# Patient Record
Sex: MALE | Race: WHITE | HISPANIC OR LATINO | URBAN - METROPOLITAN AREA
[De-identification: names, ages, dates, MRNs, and addresses within clinical notes are randomized per-mention and may not be internally consistent; named-entity substitution may affect disease eponyms.]

---

## 2017-02-09 ENCOUNTER — TELEPHONE (OUTPATIENT)
Dept: FAMILY MEDICINE | Facility: CLINIC | Age: 57
End: 2017-02-09

## 2017-02-09 DIAGNOSIS — I10 HYPERTENSION GOAL BP (BLOOD PRESSURE) < 140/90: Primary | ICD-10-CM

## 2017-02-09 DIAGNOSIS — N52.9 ERECTILE DYSFUNCTION, UNSPECIFIED ERECTILE DYSFUNCTION TYPE: ICD-10-CM

## 2017-02-09 NOTE — TELEPHONE ENCOUNTER
amLODIPine (NORVASC) 5 MG tablet      Last Written Prescription Date: 1-8-16  Last Fill Quantity: 90, # refills: 3    Last Office Visit with [facility], [facility] or [facility] prescribing provider:  1-18-16   Future Office Visit:        BP Readings from Last 3 Encounters:   01/08/16 130/88   08/13/14 124/80   10/15/13 134/86       lisinopril (PRINIVIL,ZESTRIL) 20 MG tablet      Last Written Prescription Date: 1-8-16  Last Fill Quantity: 180, # refills: 3  Last Office Visit with [facility], [facility] or [facility] prescribing provider: 1-18-16       POTASSIUM   Date Value Ref Range Status   01/08/2016 4.3 3.4 - 5.3 mmol/L Final     CREATININE   Date Value Ref Range Status   01/08/2016 0.88 0.66 - 1.25 mg/dL Final     BP Readings from Last 3 Encounters:   01/08/16 130/88   08/13/14 124/80   10/15/13 134/86       tadalafil (CIALIS) 20 MG tablet      Last Written Prescription Date: 1-8-16  Last Fill Quantity: 10,  # refills: 6   Last Office Visit with [facility], [facility] or [facility] prescribing provider: 1-18-16

## 2017-02-10 RX ORDER — AMLODIPINE BESYLATE 5 MG/1
5 TABLET ORAL DAILY
Qty: 30 TABLET | Refills: 0 | Status: SHIPPED | OUTPATIENT
Start: 2017-02-10 | End: 2017-02-28

## 2017-02-10 RX ORDER — LISINOPRIL 20 MG/1
20 TABLET ORAL 2 TIMES DAILY
Qty: 60 TABLET | Refills: 0 | Status: SHIPPED | OUTPATIENT
Start: 2017-02-10 | End: 2017-02-28

## 2017-02-10 RX ORDER — TADALAFIL 20 MG/1
10-20 TABLET ORAL DAILY PRN
Qty: 10 TABLET | Refills: 0 | Status: SHIPPED | OUTPATIENT
Start: 2017-02-10 | End: 2017-02-28

## 2017-02-10 NOTE — TELEPHONE ENCOUNTER
Routing refill request to provider for review/approval because:  Patient needs to be seen because it has been more than 1 year since last office visit.  Overdue for labs.    Melani Terry RN C[facility] Triage.

## 2017-02-27 NOTE — PATIENT INSTRUCTIONS
Preventive Health Recommendations  Male Ages 50   64    Yearly exam:             See your health care provider every year in order to  o   Review health changes.   o   Discuss preventive care.    o   Review your medicines if your doctor has prescribed any.     Have a cholesterol test every 5 years, or more frequently if you are at risk for high cholesterol/heart disease.     Have a diabetes test (fasting glucose) every three years. If you are at risk for diabetes, you should have this test more often.     Have a colonoscopy at age 50, or have a yearly FIT test (stool test). These exams will check for colon cancer.      Talk with your health care provider about whether or not a prostate cancer screening test (PSA) is right for you.    You should be tested each year for STDs (sexually transmitted diseases), if you re at risk.     Shots: Get a flu shot each year. Get a tetanus shot every 10 years.     Nutrition:    Eat at least 5 servings of fruits and vegetables daily.     Eat whole-grain bread, whole-wheat pasta and brown rice instead of white grains and rice.     Talk to your provider about Calcium and Vitamin D.     Lifestyle    Exercise for at least 150 minutes a week (30 minutes a day, 5 days a week). This will help you control your weight and prevent disease.     Limit alcohol to one drink per day.     No smoking.     Wear sunscreen to prevent skin cancer.     See your dentist every six months for an exam and cleaning.     See your eye doctor every 1 to 2 years.      Continue same medications    Keep working on healthy diet/exercise     We will send you lab results

## 2017-02-27 NOTE — PROGRESS NOTES
SUBJECTIVE:     CC: Jimbo Meyer is an 56 year old male who presents for preventative health visit.     Healthy Habits:    Do you get at least three servings of calcium containing foods daily (dairy, green leafy vegetables, etc.)? yes    Amount of exercise or daily activities, outside of work: 4 day(s) per week    Problems taking medications regularly No    Medication side effects: No    Have you had an eye exam in the past two years? no    Do you see a dentist twice per year? yes    Do you have sleep apnea, excessive snoring or daytime drowsiness?no        None    Exercising a lot; hiking and rock climbing    Occasionally checks blood pressure; similar to what we got here    cialis working fine, no side effects         Today's PHQ-2 Score:   PHQ-2 ( 1999 Pf[employer]) 2/28/2017 1/8/2016   Q1: Little interest or pleasure in doing things 0 0   Q2: Feeling down, depressed or hopeless 0 0   PHQ-2 Score 0 0       Abuse: Current or Past(Physical, Sexual or Emotional)- No  Do you feel safe in your environment - Yes    Social History   Substance Use Topics     Smoking status: Never Smoker     Smokeless tobacco: Never Used     Alcohol use Yes      Comment: occassionally drinks beer 2-4 a week     The patient does not drink >3 drinks per day nor >7 drinks per week.    Last PSA:   PSA   Date Value Ref Range Status   01/08/2016 0.62 0 - 4 ug/L Final       Recent Labs   Lab Test  01/08/16   0716  08/13/14   0750  10/15/13   0735   CHOL  195  172  191   HDL  49  46  44   LDL  109*  101  101   TRIG  187*  124  232*   CHOLHDLRATIO   --   3.7  4.4   NHDL  146*   --    --        Reviewed orders with patient. Reviewed health maintenance and updated orders accordingly - Yes    All Histories reviewed and updated in Epic.      ROS:  C: NEGATIVE for fever, chills, change in weight  I: NEGATIVE for worrisome rashes, moles or lesions  E: NEGATIVE for vision changes or irritation; has lasik few years ago, vision fine but uses readers  ENT:  "NEGATIVE for ear, mouth and throat problems  R: NEGATIVE for significant cough or SOB  CV: NEGATIVE for chest pain, palpitations or peripheral edema  GI: NEGATIVE for nausea, abdominal pain, heartburn, or change in bowel habits   male: negative for dysuria, hematuria, decreased urinary stream, erectile dysfunction, urethral discharge  M: NEGATIVE for significant arthralgias or myalgia  N: NEGATIVE for weakness, dizziness or paresthesias  P: NEGATIVE for changes in mood or affect       OBJECTIVE:     /89 (BP Location: Right arm, Patient Position: Chair, Cuff Size: Adult Regular)  Pulse 61  Temp 98.6  F (37  C) (Oral)  Ht 5' 5.16\" (1.655 m)  Wt 169 lb (76.7 kg)  SpO2 99%  BMI 27.99 kg/m2  EXAM:  GENERAL: healthy, alert and no distress  HENT: ear canals and TM's normal, nose and mouth without ulcers or lesions  NECK: no adenopathy, no asymmetry, masses, or scars and thyroid normal to palpation  RESP: lungs clear to auscultation - no rales, rhonchi or wheezes  CV: regular rate and rhythm, normal S1 S2, no S3 or S4, no murmur, click or rub, no peripheral edema and peripheral pulses strong  ABDOMEN: soft, nontender, no hepatosplenomegaly, no masses and bowel sounds normal   (male): normal male genitalia without lesions or urethral discharge, no hernia  RECTAL: normal sphincter tone, no rectal masses, prostate normal size, smooth, nontender without nodules or masses  MS: no gross musculoskeletal defects noted, no edema  SKIN: no suspicious lesions or rashes  NEURO: Normal strength and tone, mentation intact and speech normal  PSYCH: mentation appears normal, affect normal/bright    ASSESSMENT/PLAN:     Jimbo was seen today for physical, health maintenance and *_* health care directive *_*.    Diagnoses and all orders for this visit:    Routine general medical examination at a health care facility    Hypertension goal BP (blood pressure) < 140/90  -     amLODIPine (NORVASC) 5 MG tablet; Take 1 tablet (5 mg) by " "mouth daily  -     lisinopril (PRINIVIL/ZESTRIL) 20 MG tablet; Take 1 tablet (20 mg) by mouth 2 times daily    Screening examination for infectious disease  -     Hepatitis C antibody    Erectile dysfunction, unspecified erectile dysfunction type  -     tadalafil (CIALIS) 20 MG tablet; Take 0.5-1 tablets (10-20 mg) by mouth daily as needed for erectile dysfunction Never use with nitroglycerin, terazosin or doxazosin.    Hyperlipidemia LDL goal <100  -     Comprehensive metabolic panel  -     Lipid panel reflex to direct LDL    Screening for prostate cancer  -     Prostate spec antigen screen    Fatigue, unspecified type  -     TSH with free T4 reflex  -     CBC with platelets differential    Impaired fasting glucose  -     Hemoglobin A1c    Other orders  -     Cancel: Basic metabolic panel    Overall patient in good health  Blood pressure barely okay, refill meds  Up to date on colonoscopy  Check labs  Very occasionally gets vertigo.  Seen neurology in past for this.  No new symptoms recently.  Refill cialis, working fine.    COUNSELING:  Reviewed preventive health counseling, as reflected in patient instructions       Regular exercise       Healthy diet/nutrition       Vision screening       Safe sex practices/STD prevention       Colon cancer screening       Prostate cancer screening         reports that he has never smoked. He has never used smokeless tobacco.    Estimated body mass index is 27.99 kg/(m^2) as calculated from the following:    Height as of this encounter: 5' 5.16\" (1.655 m).    Weight as of this encounter: 169 lb (76.7 kg).   Weight management plan: Discussed healthy diet and exercise guidelines and patient will follow up in 12 months in clinic to re-evaluate.    Counseling Resources:  ATP IV Guidelines  Pooled Cohorts Equation Calculator  FRAX Risk Assessment  ICSI Preventive Guidelines  Dietary Guidelines for Americans, 2010  USDA's MyPlate  ASA Prophylaxis  Lung CA Screening    Sourav TRACY " MD [place]  [facility]

## 2017-02-28 ENCOUNTER — OFFICE VISIT (OUTPATIENT)
Dept: FAMILY MEDICINE | Facility: CLINIC | Age: 57
End: 2017-02-28
Payer: COMMERCIAL

## 2017-02-28 VITALS
SYSTOLIC BLOOD PRESSURE: 135 MMHG | TEMPERATURE: 98.6 F | WEIGHT: 169 LBS | OXYGEN SATURATION: 99 % | BODY MASS INDEX: 28.16 KG/M2 | HEIGHT: 65 IN | HEART RATE: 61 BPM | DIASTOLIC BLOOD PRESSURE: 89 MMHG

## 2017-02-28 DIAGNOSIS — Z11.9 SCREENING EXAMINATION FOR INFECTIOUS DISEASE: ICD-10-CM

## 2017-02-28 DIAGNOSIS — I10 HYPERTENSION GOAL BP (BLOOD PRESSURE) < 140/90: ICD-10-CM

## 2017-02-28 DIAGNOSIS — Z00.00 ROUTINE GENERAL MEDICAL EXAMINATION AT A HEALTH CARE FACILITY: Primary | ICD-10-CM

## 2017-02-28 DIAGNOSIS — R73.01 IMPAIRED FASTING GLUCOSE: ICD-10-CM

## 2017-02-28 DIAGNOSIS — R53.83 FATIGUE, UNSPECIFIED TYPE: ICD-10-CM

## 2017-02-28 DIAGNOSIS — Z12.5 SCREENING FOR PROSTATE CANCER: ICD-10-CM

## 2017-02-28 DIAGNOSIS — N52.9 ERECTILE DYSFUNCTION, UNSPECIFIED ERECTILE DYSFUNCTION TYPE: ICD-10-CM

## 2017-02-28 DIAGNOSIS — E78.5 HYPERLIPIDEMIA LDL GOAL <100: ICD-10-CM

## 2017-02-28 PROBLEM — Z71.89 ADVANCED CARE PLANNING/COUNSELING DISCUSSION: Status: ACTIVE | Noted: 2017-02-28

## 2017-02-28 LAB
ALBUMIN SERPL-MCNC: 3.7 G/DL (ref 3.4–5)
ALP SERPL-CCNC: 64 U/L (ref 40–150)
ALT SERPL W P-5'-P-CCNC: 30 U/L (ref 0–70)
ANION GAP SERPL CALCULATED.3IONS-SCNC: 8 MMOL/L (ref 3–14)
AST SERPL W P-5'-P-CCNC: 19 U/L (ref 0–45)
BASOPHILS # BLD AUTO: 0.1 10E9/L (ref 0–0.2)
BASOPHILS NFR BLD AUTO: 1.2 %
BILIRUB SERPL-MCNC: 0.4 MG/DL (ref 0.2–1.3)
BUN SERPL-MCNC: 13 MG/DL (ref 7–30)
CALCIUM SERPL-MCNC: 9 MG/DL (ref 8.5–10.1)
CHLORIDE SERPL-SCNC: 102 MMOL/L (ref 94–109)
CHOLEST SERPL-MCNC: 162 MG/DL
CO2 SERPL-SCNC: 25 MMOL/L (ref 20–32)
CREAT SERPL-MCNC: 0.79 MG/DL (ref 0.66–1.25)
DIFFERENTIAL METHOD BLD: ABNORMAL
EOSINOPHIL # BLD AUTO: 0.2 10E9/L (ref 0–0.7)
EOSINOPHIL NFR BLD AUTO: 5.2 %
ERYTHROCYTE [DISTWIDTH] IN BLOOD BY AUTOMATED COUNT: 11.7 % (ref 10–15)
GFR SERPL CREATININE-BSD FRML MDRD: NORMAL ML/MIN/1.7M2
GLUCOSE SERPL-MCNC: 82 MG/DL (ref 70–99)
HBA1C MFR BLD: 5 % (ref 4.3–6)
HCT VFR BLD AUTO: 41.3 % (ref 40–53)
HCV AB SERPL QL IA: NORMAL
HDLC SERPL-MCNC: 47 MG/DL
HGB BLD-MCNC: 14.6 G/DL (ref 13.3–17.7)
LDLC SERPL CALC-MCNC: 93 MG/DL
LYMPHOCYTES # BLD AUTO: 1.2 10E9/L (ref 0.8–5.3)
LYMPHOCYTES NFR BLD AUTO: 30.6 %
MCH RBC QN AUTO: 33.2 PG (ref 26.5–33)
MCHC RBC AUTO-ENTMCNC: 35.4 G/DL (ref 31.5–36.5)
MCV RBC AUTO: 94 FL (ref 78–100)
MONOCYTES # BLD AUTO: 0.4 10E9/L (ref 0–1.3)
MONOCYTES NFR BLD AUTO: 8.9 %
NEUTROPHILS # BLD AUTO: 2.2 10E9/L (ref 1.6–8.3)
NEUTROPHILS NFR BLD AUTO: 54.1 %
NONHDLC SERPL-MCNC: 115 MG/DL
PLATELET # BLD AUTO: 268 10E9/L (ref 150–450)
POTASSIUM SERPL-SCNC: 4.1 MMOL/L (ref 3.4–5.3)
PROT SERPL-MCNC: 6.9 G/DL (ref 6.8–8.8)
PSA SERPL-ACNC: 0.5 UG/L (ref 0–4)
RBC # BLD AUTO: 4.4 10E12/L (ref 4.4–5.9)
SODIUM SERPL-SCNC: 135 MMOL/L (ref 133–144)
TRIGL SERPL-MCNC: 111 MG/DL
TSH SERPL DL<=0.005 MIU/L-ACNC: 1.51 MU/L (ref 0.4–4)
WBC # BLD AUTO: 4.1 10E9/L (ref 4–11)

## 2017-02-28 PROCEDURE — 36415 COLL VENOUS BLD VENIPUNCTURE: CPT | Performed by: FAMILY MEDICINE

## 2017-02-28 PROCEDURE — 83036 HEMOGLOBIN GLYCOSYLATED A1C: CPT | Performed by: FAMILY MEDICINE

## 2017-02-28 PROCEDURE — G0103 PSA SCREENING: HCPCS | Performed by: FAMILY MEDICINE

## 2017-02-28 PROCEDURE — 86803 HEPATITIS C AB TEST: CPT | Performed by: FAMILY MEDICINE

## 2017-02-28 PROCEDURE — 80061 LIPID PANEL: CPT | Performed by: FAMILY MEDICINE

## 2017-02-28 PROCEDURE — 99396 PREV VISIT EST AGE 40-64: CPT | Performed by: FAMILY MEDICINE

## 2017-02-28 PROCEDURE — 80050 GENERAL HEALTH PANEL: CPT | Performed by: FAMILY MEDICINE

## 2017-02-28 RX ORDER — LISINOPRIL 20 MG/1
20 TABLET ORAL 2 TIMES DAILY
Qty: 180 TABLET | Refills: 3 | Status: SHIPPED | OUTPATIENT
Start: 2017-02-28 | End: 2017-08-08

## 2017-02-28 RX ORDER — AMLODIPINE BESYLATE 5 MG/1
5 TABLET ORAL DAILY
Qty: 90 TABLET | Refills: 3 | Status: SHIPPED | OUTPATIENT
Start: 2017-02-28 | End: 2018-04-20

## 2017-02-28 RX ORDER — TADALAFIL 20 MG/1
10-20 TABLET ORAL DAILY PRN
Qty: 10 TABLET | Refills: 6 | Status: SHIPPED | OUTPATIENT
Start: 2017-02-28 | End: 2018-05-10

## 2017-02-28 ASSESSMENT — PAIN SCALES - GENERAL: PAINLEVEL: NO PAIN (0)

## 2017-02-28 NOTE — MR AVS SNAPSHOT
After Visit Summary   2/28/2017    Jimbo Meyer    MRN: [id number]           Patient Information     Date Of Birth          1960        Visit Information        Provider Department      2/28/2017 6:40 AM Sourav Harding MD [facility]        Today's Diagnoses     Screening examination for infectious disease    -  1    Hypertension goal BP (blood pressure) < 140/90        Erectile dysfunction, unspecified erectile dysfunction type        Hyperlipidemia LDL goal <100        Screening for prostate cancer        Fatigue, unspecified type        Impaired fasting glucose          Care Instructions      Preventive Health Recommendations  Male Ages 50 - 64    Yearly exam:             See your health care provider every year in order to  o   Review health changes.   o   Discuss preventive care.    o   Review your medicines if your doctor has prescribed any.     Have a cholesterol test every 5 years, or more frequently if you are at risk for high cholesterol/heart disease.     Have a diabetes test (fasting glucose) every three years. If you are at risk for diabetes, you should have this test more often.     Have a colonoscopy at age 50, or have a yearly FIT test (stool test). These exams will check for colon cancer.      Talk with your health care provider about whether or not a prostate cancer screening test (PSA) is right for you.    You should be tested each year for STDs (sexually transmitted diseases), if you re at risk.     Shots: Get a flu shot each year. Get a tetanus shot every 10 years.     Nutrition:    Eat at least 5 servings of fruits and vegetables daily.     Eat whole-grain bread, whole-wheat pasta and brown rice instead of white grains and rice.     Talk to your provider about Calcium and Vitamin D.     Lifestyle    Exercise for at least 150 minutes a week (30 minutes a day, 5 days a week). This will help you control your weight and prevent disease.     Limit  "alcohol to one drink per day.     No smoking.     Wear sunscreen to prevent skin cancer.     See your dentist every six months for an exam and cleaning.     See your eye doctor every 1 to 2 years.      Continue same medications    Keep working on healthy diet/exercise     We will send you lab results            Follow-ups after your visit        Who to contact     If you have questions or need follow up information about today's clinic visit or your schedule please contact [facility] directly at [phone].  Normal or non-critical lab and imaging results will be communicated to you by MyChart, letter or phone within 4 business days after the clinic has received the results. If you do not hear from us within 7 days, please contact the clinic through UrgentRx[facility] or phone. If you have a critical or abnormal lab result, we will notify you by phone as soon as possible.  Submit refill requests through Clone or call your pharmacy and they will forward the refill request to us. Please allow 3 business days for your refill to be completed.          Additional Information About Your Visit        UrgentRxharBusyEvent Information     Clone lets you send messages to your doctor, view your test results, renew your prescriptions, schedule appointments and more. To sign up, go to [email or web address] . Click on \"Log in\" on the left side of the screen, which will take you to the Welcome page. Then click on \"Sign up Now\" on the right side of the page.     You will be asked to enter the access code listed below, as well as some personal information. Please follow the directions to create your username and password.     Your access code is: [email or web address]  Expires: 2017 10:07 AM     Your access code will  in 90 days. If you need help or a new code, please call your [facility] or [phone].        Care EveryWhere ID     This is your Care EveryWhere ID. This could be used by other organizations to access " "your [facility] medical records  [id number]        Your Vitals Were     Pulse Temperature Height Pulse Oximetry BMI (Body Mass Index)       61 98.6  F (37  C) (Oral) 5' 5.16\" (1.655 m) 99% 27.99 kg/m2        Blood Pressure from Last 3 Encounters:   02/28/17 135/89   01/08/16 130/88   08/13/14 124/80    Weight from Last 3 Encounters:   02/28/17 169 lb (76.7 kg)   01/08/16 183 lb (83 kg)   08/13/14 184 lb (83.5 kg)              We Performed the Following     CBC with platelets differential     Comprehensive metabolic panel     Hemoglobin A1c     Hepatitis C antibody     Lipid panel reflex to direct LDL     Prostate spec antigen screen     TSH with free T4 reflex          Where to get your medicines      These medications were sent to [facility] [id number] [facility] [address]  [address], [facility] [address]     Phone:  [phone]     amLODIPine 5 MG tablet    lisinopril 20 MG tablet    tadalafil 20 MG tablet          Primary Care Provider Office Phone # Fax #    Sourav Harding [phone]       [facility] [address]  [facility] [address]        Thank you!     Thank you for choosing [facility]  for your care. Our goal is always to provide you with excellent care. Hearing back from our patients is one way we can continue to improve our services. Please take a few minutes to complete the written survey that you may receive in the mail after your visit with us. Thank you!             Your Updated Medication List - Protect others around you: Learn how to safely use, store and throw away your medicines at www.disposemymeds.org.          This list is accurate as of: 2/28/17  7:03 AM.  Always use your most recent med list.                   Brand Name Dispense Instructions for use    amLODIPine 5 MG tablet    NORVASC    90 tablet    Take 1 tablet (5 mg) by mouth daily       aspirin 81 MG tablet     30 tablet    " Take 1 tablet by mouth daily.       calcium-vitamin D 600-400 MG-UNIT per tablet    CALTRATE    60 tablet    Take 1 tablet by mouth 2 times daily.       fish oil-omega-3 fatty acids 1000 MG capsule      take 2 g by mouth daily.       lisinopril 20 MG tablet    PRINIVIL/ZESTRIL    180 tablet    Take 1 tablet (20 mg) by mouth 2 times daily       MULTIVITAMIN TABS   OR      1 TABLET DAILY       tadalafil 20 MG tablet    CIALIS    10 tablet    Take 0.5-1 tablets (10-20 mg) by mouth daily as needed for erectile dysfunction Never use with nitroglycerin, terazosin or doxazosin.

## 2017-02-28 NOTE — NURSING NOTE
"Chief Complaint   Patient presents with     Physical     Health Maintenance     *_* Health Care Directive *_*       Initial /89 (BP Location: Right arm, Patient Position: Chair, Cuff Size: Adult Regular)  Pulse 61  Temp 98.6  F (37  C) (Oral)  Ht 5' 5.16\" (1.655 m)  Wt 169 lb (76.7 kg)  SpO2 99%  BMI 27.99 kg/m2 Estimated body mass index is 27.99 kg/(m^2) as calculated from the following:    Height as of this encounter: 5' 5.16\" (1.655 m).    Weight as of this encounter: 169 lb (76.7 kg).  Medication Reconciliation: complete   Bailey Estrella CMA      "

## 2017-02-28 NOTE — LETTER
[facility]  [address] [place]  [address]  [phone]        March 2, 2017    Jimbo Meyer  [address] [place] [address]        Dear Jimbo,    Your labs are all fine.     Results for orders placed or performed in visit on 02/28/17   Hepatitis C antibody   Result Value Ref Range    Hepatitis C Antibody  NR     Nonreactive   Assay performance characteristics have not been established for newborns,   infants, and children     TSH with free T4 reflex   Result Value Ref Range    TSH 1.51 0.40 - 4.00 mU/L   Comprehensive metabolic panel   Result Value Ref Range    Sodium 135 133 - 144 mmol/L    Potassium 4.1 3.4 - 5.3 mmol/L    Chloride 102 94 - 109 mmol/L    Carbon Dioxide 25 20 - 32 mmol/L    Anion Gap 8 3 - 14 mmol/L    Glucose 82 70 - 99 mg/dL    Urea Nitrogen 13 7 - 30 mg/dL    Creatinine 0.79 0.66 - 1.25 mg/dL    GFR Estimate >90  Non  GFR Calc   >60 mL/min/1.7m2    GFR Estimate If Black >90   GFR Calc   >60 mL/min/1.7m2    Calcium 9.0 8.5 - 10.1 mg/dL    Bilirubin Total 0.4 0.2 - 1.3 mg/dL    Albumin 3.7 3.4 - 5.0 g/dL    Protein Total 6.9 6.8 - 8.8 g/dL    Alkaline Phosphatase 64 40 - 150 U/L    ALT 30 0 - 70 U/L    AST 19 0 - 45 U/L   Hemoglobin A1c   Result Value Ref Range    Hemoglobin A1C 5.0 4.3 - 6.0 %   Prostate spec antigen screen   Result Value Ref Range    PSA 0.50 0 - 4 ug/L   Lipid panel reflex to direct LDL   Result Value Ref Range    Cholesterol 162 <200 mg/dL    Triglycerides 111 <150 mg/dL    HDL Cholesterol 47 >39 mg/dL    LDL Cholesterol Calculated 93 <100 mg/dL    Non HDL Cholesterol 115 <130 mg/dL   CBC with platelets differential   Result Value Ref Range    WBC 4.1 4.0 - 11.0 10e9/L    RBC Count 4.40 4.4 - 5.9 10e12/L    Hemoglobin 14.6 13.3 - 17.7 g/dL    Hematocrit 41.3 40.0 - 53.0 %    MCV 94 78 - 100 fl    MCH 33.2 (H) 26.5 - 33.0 pg    MCHC 35.4 31.5 - 36.5 g/dL    RDW 11.7 10.0 - 15.0 %     Platelet Count 268 150 - 450 10e9/L    Diff Method Automated Method     % Neutrophils 54.1 %    % Lymphocytes 30.6 %    % Monocytes 8.9 %    % Eosinophils 5.2 %    % Basophils 1.2 %    Absolute Neutrophil 2.2 1.6 - 8.3 10e9/L    Absolute Lymphocytes 1.2 0.8 - 5.3 10e9/L    Absolute Monocytes 0.4 0.0 - 1.3 10e9/L    Absolute Eosinophils 0.2 0.0 - 0.7 10e9/L    Absolute Basophils 0.1 0.0 - 0.2 10e9/L       If you have any questions please call the clinic at [phone].    Sincerely,    Sourav BOWERL

## 2017-05-17 ENCOUNTER — RADIANT APPOINTMENT (OUTPATIENT)
Dept: GENERAL RADIOLOGY | Facility: CLINIC | Age: 57
End: 2017-05-17
Attending: FAMILY MEDICINE
Payer: COMMERCIAL

## 2017-05-17 ENCOUNTER — OFFICE VISIT (OUTPATIENT)
Dept: FAMILY MEDICINE | Facility: CLINIC | Age: 57
End: 2017-05-17
Payer: COMMERCIAL

## 2017-05-17 VITALS
SYSTOLIC BLOOD PRESSURE: 134 MMHG | HEART RATE: 62 BPM | DIASTOLIC BLOOD PRESSURE: 82 MMHG | WEIGHT: 178 LBS | TEMPERATURE: 98.9 F | BODY MASS INDEX: 29.48 KG/M2 | OXYGEN SATURATION: 98 %

## 2017-05-17 DIAGNOSIS — J20.9 ACUTE BRONCHITIS WITH SYMPTOMS > 10 DAYS: ICD-10-CM

## 2017-05-17 DIAGNOSIS — R05.9 COUGH: Primary | ICD-10-CM

## 2017-05-17 DIAGNOSIS — R05.9 COUGH: ICD-10-CM

## 2017-05-17 PROCEDURE — 71020 XR CHEST 2 VW: CPT

## 2017-05-17 PROCEDURE — 99213 OFFICE O/P EST LOW 20 MIN: CPT | Performed by: FAMILY MEDICINE

## 2017-05-17 RX ORDER — AZITHROMYCIN 250 MG/1
TABLET, FILM COATED ORAL
Qty: 6 TABLET | Refills: 1 | Status: SHIPPED | OUTPATIENT
Start: 2017-05-17 | End: 2018-05-10

## 2017-05-17 NOTE — PROGRESS NOTES
SUBJECTIVE:                                                    Jimbo Meyer is a 56 year old male who presents to clinic today for the following health issues:      Acute Illness   Acute illness concerns: URI  Onset: 1 month    Fever: no    Chills/Sweats: no    Headache (location?): no    Sinus Pressure:no    Conjunctivitis:  no    Ear Pain: no    Rhinorrhea: YES    Congestion: YES- chest    Sore Throat: no     Cough: YES-productive of green sputum    Wheeze: no    Decreased Appetite: no    Nausea: no    Vomiting: no    Diarrhea:  no    Dysuria/Freq.: no    Fatigue/Achiness: YES    Sick/Strep Exposure: YES- work     Therapies Tried and outcome: OTC Zicam,              Problem list and histories reviewed & adjusted, as indicated.  Additional history: as documented         Reviewed and updated as needed this visit by clinical staff       Reviewed and updated as needed this visit by Provider       Started throat    Coughing up stuff from chest    Still working etc    A little exercise outside of work    Occasional bad cough at night    Had runny nose, not now    No other allergy type symptoms     Physical Exam   Constitutional: He is oriented to person, place, and time and well-developed, well-nourished, and in no distress.   HENT:   Head: Normocephalic and atraumatic.   Right Ear: Tympanic membrane, external ear and ear canal normal.   Left Ear: Tympanic membrane, external ear and ear canal normal.   No submandib / sinus tenderness     Eyes: Conjunctivae are normal.   Neck: Neck supple.   Cardiovascular: Normal rate, regular rhythm and normal heart sounds.    Pulmonary/Chest: Effort normal and breath sounds normal. No respiratory distress. He exhibits no tenderness.   No back or costovertebral angle tenderness     Abdominal: Soft. There is no tenderness.   Lymphadenopathy:     He has no cervical adenopathy.   Neurological: He is alert and oriented to person, place, and time.            cxr no  infiltrate    ASSESSMENT / PLAN:  (R05) Cough  (primary encounter diagnosis)  Comment: no pneumonia  Plan: XR Chest 2 Views             (J20.9) Acute bronchitis with symptoms > 10 days  Comment: given duration of symptoms and lack of other explanation ( no obvious allergy/ acid symptoms ) will treat for infection   Plan: azithromycin (ZITHROMAX) 250 MG tablet         Follow up if symptoms not resolving       I reviewed the patient's medications, allergies, medical history, family history, and social history.    Sourav Harding MD

## 2017-05-17 NOTE — MR AVS SNAPSHOT
"              After Visit Summary   5/17/2017    Jimbo Meyer    MRN: [id number]           Patient Information     Date Of Birth          1960        Visit Information        Provider Department      5/17/2017 2:20 PM Sourav Harding MD [facility]        Today's Diagnoses     Cough    -  1    Acute bronchitis with symptoms > 10 days          Care Instructions    Take the antibiotics ; keeps working for a week after finishing    One refill if needed    Stay well hydrated    Follow up as needed based on symptoms         Follow-ups after your visit        Who to contact     If you have questions or need follow up information about today's clinic visit or your schedule please contact [facility] directly at [phone].  Normal or non-critical lab and imaging results will be communicated to you by MyChart, letter or phone within 4 business days after the clinic has received the results. If you do not hear from us within 7 days, please contact the clinic through MyChart or phone. If you have a critical or abnormal lab result, we will notify you by phone as soon as possible.  Submit refill requests through FastCall or call your pharmacy and they will forward the refill request to us. Please allow 3 business days for your refill to be completed.          Additional Information About Your Visit        MyChart Information     FastCall lets you send messages to your doctor, view your test results, renew your prescriptions, schedule appointments and more. To sign up, go to [email or web address] . Click on \"Log in\" on the left side of the screen, which will take you to the Welcome page. Then click on \"Sign up Now\" on the right side of the page.     You will be asked to enter the access code listed below, as well as some personal information. Please follow the directions to create your username and password.     Your access code is: [email or web address]  Expires: 5/22/2017 11:07 " AM     Your access code will  in 90 days. If you need help or a new code, please call your [place] clinic or [phone].        Care EveryWhere ID     This is your Care EveryWhere ID. This could be used by other organizations to access your [place] medical records  [id number]        Your Vitals Were     Pulse Temperature Pulse Oximetry BMI (Body Mass Index)          62 98.9  F (37.2  C) (Oral) 98% 29.48 kg/m2         Blood Pressure from Last 3 Encounters:   17 134/82   17 135/89   16 130/88    Weight from Last 3 Encounters:   17 178 lb (80.7 kg)   17 169 lb (76.7 kg)   16 183 lb (83 kg)                 Today's Medication Changes          These changes are accurate as of: 17  2:55 PM.  If you have any questions, ask your nurse or doctor.               Start taking these medicines.        Dose/Directions    azithromycin 250 MG tablet   Commonly known as:  ZITHROMAX   Used for:  Acute bronchitis with symptoms > 10 days   Started by:  Sourav Harding MD        2 po daily x one day then 1 po daily x 4 days   Quantity:  6 tablet   Refills:  1            Where to get your medicines      These medications were sent to [facility]  [facility] [address]  [address], [facility] [address]     Phone:  [phone]     azithromycin 250 MG tablet                Primary Care Provider Office Phone # Fax #    Sourav Harding [phone]       [facility] [address] [facility] [address]        Thank you!     Thank you for choosing [facility]  for your care. Our goal is always to provide you with excellent care. Hearing back from our patients is one way we can continue to improve our services. Please take a few minutes to complete the written survey that you may receive in the mail after your visit with us. Thank you!             Your Updated Medication List -  Protect others around you: Learn how to safely use, store and throw away your medicines at www.disposemymeds.org.          This list is accurate as of: 5/17/17  2:55 PM.  Always use your most recent med list.                   Brand Name Dispense Instructions for use    amLODIPine 5 MG tablet    NORVASC    90 tablet    Take 1 tablet (5 mg) by mouth daily       aspirin 81 MG tablet     30 tablet    Take 1 tablet by mouth daily.       azithromycin 250 MG tablet    ZITHROMAX    6 tablet    2 po daily x one day then 1 po daily x 4 days       calcium-vitamin D 600-400 MG-UNIT per tablet    CALTRATE    60 tablet    Take 1 tablet by mouth 2 times daily.       fish oil-omega-3 fatty acids 1000 MG capsule      take 2 g by mouth daily.       lisinopril 20 MG tablet    PRINIVIL/ZESTRIL    180 tablet    Take 1 tablet (20 mg) by mouth 2 times daily       MULTIVITAMIN TABS   OR      1 TABLET DAILY       tadalafil 20 MG tablet    CIALIS    10 tablet    Take 0.5-1 tablets (10-20 mg) by mouth daily as needed for erectile dysfunction Never use with nitroglycerin, terazosin or doxazosin.

## 2017-05-17 NOTE — NURSING NOTE
"Chief Complaint   Patient presents with     Cold Symptoms     cough for 1 month       Initial BP (!) 140/95 (BP Location: Left arm, Patient Position: Chair, Cuff Size: Adult Regular)  Pulse 62  Temp 98.9  F (37.2  C) (Oral)  Wt 178 lb (80.7 kg)  SpO2 98%  BMI 29.48 kg/m2 Estimated body mass index is 29.48 kg/(m^2) as calculated from the following:    Height as of 2/28/17: 5' 5.16\" (1.655 m).    Weight as of this encounter: 178 lb (80.7 kg).  Medication Reconciliation: incomplete     FREDDY Garcia MA      "

## 2017-05-17 NOTE — PATIENT INSTRUCTIONS
Take the antibiotics ; keeps working for a week after finishing    One refill if needed    Stay well hydrated    Follow up as needed based on symptoms

## 2017-08-08 ENCOUNTER — TELEPHONE (OUTPATIENT)
Dept: FAMILY MEDICINE | Facility: CLINIC | Age: 57
End: 2017-08-08

## 2017-08-08 DIAGNOSIS — I10 HYPERTENSION GOAL BP (BLOOD PRESSURE) < 140/90: Primary | ICD-10-CM

## 2017-08-08 RX ORDER — LISINOPRIL 40 MG/1
40 TABLET ORAL DAILY
Qty: 90 TABLET | Refills: 3 | Status: SHIPPED | OUTPATIENT
Start: 2017-08-08 | End: 2018-05-10

## 2017-08-08 NOTE — TELEPHONE ENCOUNTER
PA is needed for the medication, Lisinopril 20mg.     Insurance has been called.  Alternatives that are covered are: insurance will cover if medication is prescribed as once a day dose.         Would you like to start PA or Rx alternative medication?    If PA, please list all medications this patient has tried along with any contraindications that may have been experienced in the past. Thank you.    Pharmacy: [facility]  Phone: [phone]     Insurance Plan: [employer]  Phone: [phone]  Pt ID: [id number]  Group: [id number]     Forwarded to PCP for review.

## 2017-08-08 NOTE — TELEPHONE ENCOUNTER
Change to 40 daily instead of 20 bid.  I sent in prescription.    Please inform patient.      Sourav Harding MD

## 2018-04-20 ENCOUNTER — TELEPHONE (OUTPATIENT)
Dept: FAMILY MEDICINE | Facility: CLINIC | Age: 58
End: 2018-04-20

## 2018-04-20 DIAGNOSIS — I10 HYPERTENSION GOAL BP (BLOOD PRESSURE) < 140/90: ICD-10-CM

## 2018-04-20 RX ORDER — AMLODIPINE BESYLATE 5 MG/1
TABLET ORAL
Qty: 30 TABLET | Refills: 0 | Status: SHIPPED | OUTPATIENT
Start: 2018-04-20 | End: 2018-05-10

## 2018-04-20 NOTE — TELEPHONE ENCOUNTER
Remedios given x1 today. Patient needs office visit, please schedule. Patient is due for BP visit and labs    Thanks!  Rao Oakley RN

## 2018-04-20 NOTE — TELEPHONE ENCOUNTER
"Requested Prescriptions   Pending Prescriptions Disp Refills     amLODIPine (NORVASC) 5 MG tablet [Pharmacy Med Name: AMLODIPINE 5MG TAB] 90 tablet 3    Last Written Prescription Date:  2/28/17  Last Fill Quantity: 90,  # refills: 3   Last office visit: 5/17/2017 with prescribing provider:     Future Office Visit:     Sig: TAKE ONE TABLET BY MOUTH ONCE DAILY    Calcium Channel Blockers Protocol  Failed    4/20/2018  6:17 AM       Failed - Normal serum creatinine on file in past 12 months    Recent Labs   Lab Test  02/28/17   0709   CR  0.79            Passed - Blood pressure under 140/90 in past 12 months    BP Readings from Last 3 Encounters:   05/17/17 134/82   02/28/17 135/89   01/08/16 130/88                Passed - Recent (12 mo) or future (30 days) visit within the authorizing provider's specialty    Patient had office visit in the last 12 months or has a visit in the next 30 days with authorizing provider or within the authorizing provider's specialty.  See \"Patient Info\" tab in inbasket, or \"Choose Columns\" in Meds & Orders section of the refill encounter.           Passed - Patient is age 18 or older          "

## 2018-05-10 ENCOUNTER — OFFICE VISIT (OUTPATIENT)
Dept: FAMILY MEDICINE | Facility: CLINIC | Age: 58
End: 2018-05-10
Payer: COMMERCIAL

## 2018-05-10 VITALS
HEART RATE: 59 BPM | DIASTOLIC BLOOD PRESSURE: 89 MMHG | SYSTOLIC BLOOD PRESSURE: 125 MMHG | WEIGHT: 177.38 LBS | TEMPERATURE: 97.9 F | HEIGHT: 65 IN | BODY MASS INDEX: 29.55 KG/M2

## 2018-05-10 DIAGNOSIS — Z23 NEED FOR TD VACCINE: ICD-10-CM

## 2018-05-10 DIAGNOSIS — R53.83 FATIGUE, UNSPECIFIED TYPE: ICD-10-CM

## 2018-05-10 DIAGNOSIS — Z00.00 ROUTINE GENERAL MEDICAL EXAMINATION AT A HEALTH CARE FACILITY: Primary | ICD-10-CM

## 2018-05-10 DIAGNOSIS — R73.01 IMPAIRED FASTING GLUCOSE: ICD-10-CM

## 2018-05-10 DIAGNOSIS — E78.5 HYPERLIPIDEMIA LDL GOAL <100: ICD-10-CM

## 2018-05-10 DIAGNOSIS — N52.9 ERECTILE DYSFUNCTION, UNSPECIFIED ERECTILE DYSFUNCTION TYPE: ICD-10-CM

## 2018-05-10 DIAGNOSIS — Z12.5 SCREENING FOR PROSTATE CANCER: ICD-10-CM

## 2018-05-10 DIAGNOSIS — Z11.9 SCREENING EXAMINATION FOR INFECTIOUS DISEASE: ICD-10-CM

## 2018-05-10 DIAGNOSIS — I10 HYPERTENSION GOAL BP (BLOOD PRESSURE) < 140/90: ICD-10-CM

## 2018-05-10 DIAGNOSIS — H54.7 VISION PROBLEM: ICD-10-CM

## 2018-05-10 LAB
ALBUMIN SERPL-MCNC: 3.8 G/DL (ref 3.4–5)
ALP SERPL-CCNC: 57 U/L (ref 40–150)
ALT SERPL W P-5'-P-CCNC: 38 U/L (ref 0–70)
ANION GAP SERPL CALCULATED.3IONS-SCNC: 7 MMOL/L (ref 3–14)
AST SERPL W P-5'-P-CCNC: 23 U/L (ref 0–45)
BASOPHILS # BLD AUTO: 0.1 10E9/L (ref 0–0.2)
BASOPHILS NFR BLD AUTO: 1.2 %
BILIRUB SERPL-MCNC: 0.9 MG/DL (ref 0.2–1.3)
BUN SERPL-MCNC: 13 MG/DL (ref 7–30)
CALCIUM SERPL-MCNC: 8.9 MG/DL (ref 8.5–10.1)
CHLORIDE SERPL-SCNC: 103 MMOL/L (ref 94–109)
CHOLEST SERPL-MCNC: 144 MG/DL
CO2 SERPL-SCNC: 26 MMOL/L (ref 20–32)
CREAT SERPL-MCNC: 0.88 MG/DL (ref 0.66–1.25)
DIFFERENTIAL METHOD BLD: NORMAL
EOSINOPHIL # BLD AUTO: 0.3 10E9/L (ref 0–0.7)
EOSINOPHIL NFR BLD AUTO: 7.4 %
ERYTHROCYTE [DISTWIDTH] IN BLOOD BY AUTOMATED COUNT: 11.4 % (ref 10–15)
GFR SERPL CREATININE-BSD FRML MDRD: 89 ML/MIN/1.7M2
GLUCOSE SERPL-MCNC: 92 MG/DL (ref 70–99)
HBA1C MFR BLD: 5.3 % (ref 0–5.6)
HCT VFR BLD AUTO: 41.3 % (ref 40–53)
HDLC SERPL-MCNC: 47 MG/DL
HGB BLD-MCNC: 14.4 G/DL (ref 13.3–17.7)
HIV 1+2 AB+HIV1 P24 AG SERPL QL IA: NONREACTIVE
LDLC SERPL CALC-MCNC: 82 MG/DL
LYMPHOCYTES # BLD AUTO: 1 10E9/L (ref 0.8–5.3)
LYMPHOCYTES NFR BLD AUTO: 23.5 %
MCH RBC QN AUTO: 32.6 PG (ref 26.5–33)
MCHC RBC AUTO-ENTMCNC: 34.9 G/DL (ref 31.5–36.5)
MCV RBC AUTO: 93 FL (ref 78–100)
MONOCYTES # BLD AUTO: 0.4 10E9/L (ref 0–1.3)
MONOCYTES NFR BLD AUTO: 9 %
NEUTROPHILS # BLD AUTO: 2.6 10E9/L (ref 1.6–8.3)
NEUTROPHILS NFR BLD AUTO: 58.9 %
NONHDLC SERPL-MCNC: 97 MG/DL
PLATELET # BLD AUTO: 254 10E9/L (ref 150–450)
POTASSIUM SERPL-SCNC: 4.1 MMOL/L (ref 3.4–5.3)
PROT SERPL-MCNC: 7.2 G/DL (ref 6.8–8.8)
PSA SERPL-ACNC: 0.46 UG/L (ref 0–4)
RBC # BLD AUTO: 4.42 10E12/L (ref 4.4–5.9)
SODIUM SERPL-SCNC: 136 MMOL/L (ref 133–144)
TRIGL SERPL-MCNC: 75 MG/DL
TSH SERPL DL<=0.005 MIU/L-ACNC: 1.46 MU/L (ref 0.4–4)
WBC # BLD AUTO: 4.3 10E9/L (ref 4–11)

## 2018-05-10 PROCEDURE — 36415 COLL VENOUS BLD VENIPUNCTURE: CPT | Performed by: FAMILY MEDICINE

## 2018-05-10 PROCEDURE — 80061 LIPID PANEL: CPT | Performed by: FAMILY MEDICINE

## 2018-05-10 PROCEDURE — 85025 COMPLETE CBC W/AUTO DIFF WBC: CPT | Performed by: FAMILY MEDICINE

## 2018-05-10 PROCEDURE — G0103 PSA SCREENING: HCPCS | Performed by: FAMILY MEDICINE

## 2018-05-10 PROCEDURE — 90471 IMMUNIZATION ADMIN: CPT | Performed by: FAMILY MEDICINE

## 2018-05-10 PROCEDURE — 83036 HEMOGLOBIN GLYCOSYLATED A1C: CPT | Performed by: FAMILY MEDICINE

## 2018-05-10 PROCEDURE — 84443 ASSAY THYROID STIM HORMONE: CPT | Performed by: FAMILY MEDICINE

## 2018-05-10 PROCEDURE — 90714 TD VACC NO PRESV 7 YRS+ IM: CPT | Performed by: FAMILY MEDICINE

## 2018-05-10 PROCEDURE — 87389 HIV-1 AG W/HIV-1&-2 AB AG IA: CPT | Performed by: FAMILY MEDICINE

## 2018-05-10 PROCEDURE — 80053 COMPREHEN METABOLIC PANEL: CPT | Performed by: FAMILY MEDICINE

## 2018-05-10 PROCEDURE — 99396 PREV VISIT EST AGE 40-64: CPT | Mod: 25 | Performed by: FAMILY MEDICINE

## 2018-05-10 RX ORDER — AMLODIPINE BESYLATE 5 MG/1
5 TABLET ORAL DAILY
Qty: 90 TABLET | Refills: 3 | Status: SHIPPED | OUTPATIENT
Start: 2018-05-10 | End: 2019-06-29

## 2018-05-10 RX ORDER — LISINOPRIL 40 MG/1
40 TABLET ORAL DAILY
Qty: 90 TABLET | Refills: 3 | Status: SHIPPED | OUTPATIENT
Start: 2018-05-10 | End: 2019-08-09

## 2018-05-10 RX ORDER — TADALAFIL 20 MG/1
20 TABLET ORAL DAILY PRN
Qty: 10 TABLET | Refills: 6 | Status: SHIPPED | OUTPATIENT
Start: 2018-05-10 | End: 2019-06-29

## 2018-05-10 ASSESSMENT — PAIN SCALES - GENERAL: PAINLEVEL: NO PAIN (0)

## 2018-05-10 NOTE — PROGRESS NOTES
SUBJECTIVE:   CC: Jimbo Meyer is an 57 year old male who presents for preventative health visit.     Physical   Annual:     Getting at least 3 servings of Calcium per day::  Yes    Bi-annual eye exam::  NO    Dental care twice a year::  Yes    Sleep apnea or symptoms of sleep apnea::  Daytime drowsiness and Excessive snoring    Diet::  Regular (no restrictions)    Frequency of exercise::  4-5 days/week    Duration of exercise::  Greater than 60 minutes    Taking medications regularly::  Yes    Medication side effects::  Other    Additional concerns today::  No                none    About 2 weeks ago was hiking out in nature, sat down and felt funny.  Left eye went blurry for 15 minutes, then back to normal.    Just reading glasses.    Years since last eye exam    Today's PHQ-2 Score:   PHQ-2 ( 1999 Pf[employer]) 5/10/2018   Q1: Little interest or pleasure in doing things 0   Q2: Feeling down, depressed or hopeless 0   PHQ-2 Score 0   Q1: Little interest or pleasure in doing things Not at all   Q2: Feeling down, depressed or hopeless Not at all   PHQ-2 Score 0       Abuse: Current or Past(Physical, Sexual or Emotional)- No  Do you feel safe in your environment - Yes    Social History   Substance Use Topics     Smoking status: Never Smoker     Smokeless tobacco: Never Used     Alcohol use Yes      Comment: occassionally drinks beer 2-4 a week     Alcohol Use 5/10/2018   If you drink alcohol do you typically have greater than 3 drinks per day OR greater than 7 drinks per week? No   No flowsheet data found.    Last PSA:   PSA   Date Value Ref Range Status   02/28/2017 0.50 0 - 4 ug/L Final     Comment:     Assay Method:  Chemiluminescence using Siemens Vista analyzer       Reviewed orders with patient. Reviewed health maintenance and updated orders accordingly - Yes       Reviewed and updated as needed this visit by clinical staff  Tobacco  Allergies  Meds  Med Hx  Surg Hx  Fam Hx  Soc Hx        Reviewed and  "updated as needed this visit by Provider            Review of Systems  C: NEGATIVE for fever, chills, change in weight  I: NEGATIVE for worrisome rashes, moles or lesions  E: NEGATIVE for vision changes or irritation  ENT: NEGATIVE for ear, mouth and throat problems  R: NEGATIVE for significant cough or SOB  CV: NEGATIVE for chest pain, palpitations or peripheral edema  GI: NEGATIVE for nausea, abdominal pain, heartburn, or change in bowel habits   male: negative for dysuria, hematuria, decreased urinary stream, erectile dysfunction, urethral discharge  M: NEGATIVE for significant arthralgias or myalgia  N: NEGATIVE for weakness, dizziness or paresthesias  P: NEGATIVE for changes in mood or affect    Hiking a lot     Works out late at night and has to get up early  Thus not a lot of sleep      OBJECTIVE:   /89 (BP Location: Left arm, Patient Position: Chair, Cuff Size: Adult Regular)  Pulse 59  Temp 97.9  F (36.6  C) (Oral)  Ht 5' 5.06\" (1.652 m)  Wt 177 lb 6 oz (80.5 kg)  BMI 29.46 kg/m2    Physical Exam  GENERAL: healthy, alert and no distress  EYES: Eyes grossly normal to inspection, PERRL and conjunctivae and sclerae normal  HENT: ear canals and TM's normal, nose and mouth without ulcers or lesions  NECK: no adenopathy, no asymmetry, masses, or scars and thyroid normal to palpation  RESP: lungs clear to auscultation - no rales, rhonchi or wheezes  CV: regular rate and rhythm, normal S1 S2, no S3 or S4, no murmur, click or rub, no peripheral edema and peripheral pulses strong  ABDOMEN: soft, nontender, no hepatosplenomegaly, no masses and bowel sounds normal   (male): normal male genitalia without lesions or urethral discharge, no hernia  RECTAL: normal sphincter tone, no rectal masses, prostate normal size, smooth, nontender without nodules or masses  MS: no gross musculoskeletal defects noted, no edema  SKIN: no suspicious lesions or rashes  NEURO: Normal strength and tone, mentation intact and " speech normal  PSYCH: mentation appears normal, affect normal/bright    ASSESSMENT/PLAN:   Jimbo was seen today for physical and health maintenance.    Diagnoses and all orders for this visit:    Routine general medical examination at a health care facility    Hypertension goal BP (blood pressure) < 140/90  -     amLODIPine (NORVASC) 5 MG tablet; Take 1 tablet (5 mg) by mouth daily  -     lisinopril (PRINIVIL/ZESTRIL) 40 MG tablet; Take 1 tablet (40 mg) by mouth daily    Erectile dysfunction, unspecified erectile dysfunction type  -     tadalafil (CIALIS) 20 MG tablet; Take 1 tablet (20 mg) by mouth daily as needed Never use with nitroglycerin, terazosin or doxazosin.    Need for TD vaccine  -     VACCINE ADMINISTRATION, INITIAL  -     TD (ADULT, 7+) PRESERVE FREE    Screening examination for infectious disease  -     HIV Antigen Antibody Combo    Vision problem  -     OPTOMETRY REFERRAL    Screening for prostate cancer  -     Prostate spec antigen screen    Fatigue, unspecified type  -     TSH with free T4 reflex  -     CBC with platelets differential    Impaired fasting glucose  -     Hemoglobin A1c    Hyperlipidemia LDL goal <100  -     Lipid panel reflex to direct LDL Fasting  -     Comprehensive metabolic panel    Other orders  -     Cancel: Basic metabolic panel      Discussed multiple issues with patient  Keep working on healthy diet/exercise and wt loss  Patient to call and schedule eye exam  If he gets more visual symptoms that don't quickly resolve, be seen promptly  Refill meds  Check labs   No std concern  Td given      COUNSELING:   Reviewed preventive health counseling, as reflected in patient instructions       Regular exercise       Healthy diet/nutrition       Vision screening       Safe sex practices/STD prevention       Colon cancer screening       Prostate cancer screening         reports that he has never smoked. He has never used smokeless tobacco.    Estimated body mass index is 29.46 kg/(m^2)  "as calculated from the following:    Height as of this encounter: 5' 5.06\" (1.652 m).    Weight as of this encounter: 177 lb 6 oz (80.5 kg).   Weight management plan: Discussed healthy diet and exercise guidelines and patient will follow up in 12 months in clinic to re-evaluate.    Counseling Resources:  ATP IV Guidelines  Pooled Cohorts Equation Calculator  FRAX Risk Assessment  ICSI Preventive Guidelines  Dietary Guidelines for Americans, 2010  USDA's MyPlate  ASA Prophylaxis  Lung CA Screening    Sourav Harding MD  [facility]  Answers for HPI/ROS submitted by the patient on 5/10/2018   PHQ-2 Score: 0    "

## 2018-05-10 NOTE — MR AVS SNAPSHOT
After Visit Summary   5/10/2018    Jimbo Meyer    MRN: [id number]           Patient Information     Date Of Birth          1960        Visit Information        Provider Department      5/10/2018 6:40 AM Sourav Harding MD [facility]        Today's Diagnoses     Need for TD vaccine    -  1    Hypertension goal BP (blood pressure) < 140/90        Erectile dysfunction, unspecified erectile dysfunction type        Screening examination for infectious disease        Vision problem        Screening for prostate cancer        Fatigue, unspecified type        Impaired fasting glucose        Hyperlipidemia LDL goal <100          Care Instructions    Schedule eye exam    We will send you lab results    Continue same medications     Keep working on healthy diet/exercise           Follow-ups after your visit        Additional Services     OPTOMETRY REFERRAL       Your provider has referred you to: FMG: [facility] [phone]   [email or web address]  FMG: [facility] [phone]    [email or web address]/  FMG: [facility] [phone]    [email or web address]/[email or web address]/  FMG: [facility] [phone]    [email or web address].[facility]/  UMP: [facility] [phone]   [email or web address]  UMP: [facility] [phone]   [email or web address]/    Or other covered eye doctor     Please be aware that coverage of these services is subject to the terms and limitations of your health insurance plan.  Call member services at your health plan with any benefit or coverage questions.      Please bring the following with you to your appointment:    (1) Any X-Rays, CTs or MRIs which have  "been performed.  Contact the facility where they were done to arrange for  prior to your scheduled appointment.    (2) List of current medications  (3) This referral request   (4) Any documents/labs given to you for this referral                  Who to contact     If you have questions or need follow up information about today's clinic visit or your schedule please contact [facility] directly at [phone].  Normal or non-critical lab and imaging results will be communicated to you by MyChart, letter or phone within 4 business days after the clinic has received the results. If you do not hear from us within 7 days, please contact the clinic through [email or web address] or phone. If you have a critical or abnormal lab result, we will notify you by phone as soon as possible.  Submit refill requests through Pawngo or call your pharmacy and they will forward the refill request to us. Please allow 3 business days for your refill to be completed.          Additional Information About Your Visit        Qinqin.comharGoodClic Information     Pawngo lets you send messages to your doctor, view your test results, renew your prescriptions, schedule appointments and more. To sign up, go to [email or web address] . Click on \"Log in\" on the left side of the screen, which will take you to the Welcome page. Then click on \"Sign up Now\" on the right side of the page.     You will be asked to enter the access code listed below, as well as some personal information. Please follow the directions to create your username and password.     Your access code is: [email or web address]  Expires: 2018  7:07 AM     Your access code will  in 90 days. If you need help or a new code, please call your [place] clinic or [phone].        Care EveryWhere ID     This is your Care EveryWhere ID. This could be used by other organizations to access your [place] medical records  [id number]        Your Vitals Were     Pulse Temperature Height " "BMI (Body Mass Index)          59 97.9  F (36.6  C) (Oral) 5' 5.06\" (1.652 m) 29.46 kg/m2         Blood Pressure from Last 3 Encounters:   05/10/18 125/89   05/17/17 134/82   02/28/17 135/89    Weight from Last 3 Encounters:   05/10/18 177 lb 6 oz (80.5 kg)   05/17/17 178 lb (80.7 kg)   02/28/17 169 lb (76.7 kg)              We Performed the Following     CBC with platelets differential     Comprehensive metabolic panel     Hemoglobin A1c     HIV Antigen Antibody Combo     Lipid panel reflex to direct LDL Fasting     OPTOMETRY REFERRAL     Prostate spec antigen screen     TD (ADULT, 7+) PRESERVE FREE     TSH with free T4 reflex     VACCINE ADMINISTRATION, INITIAL          Today's Medication Changes          These changes are accurate as of 5/10/18  7:07 AM.  If you have any questions, ask your nurse or doctor.               These medicines have changed or have updated prescriptions.        Dose/Directions    amLODIPine 5 MG tablet   Commonly known as:  NORVASC   This may have changed:  See the new instructions.   Used for:  Hypertension goal BP (blood pressure) < 140/90   Changed by:  Sourav Harding MD        Dose:  5 mg   Take 1 tablet (5 mg) by mouth daily   Quantity:  90 tablet   Refills:  3       tadalafil 20 MG tablet   Commonly known as:  CIALIS   This may have changed:    - how much to take  - reasons to take this   Used for:  Erectile dysfunction, unspecified erectile dysfunction type   Changed by:  Sourav Harding MD        Dose:  20 mg   Take 1 tablet (20 mg) by mouth daily as needed Never use with nitroglycerin, terazosin or doxazosin.   Quantity:  10 tablet   Refills:  6            Where to get your medicines      These medications were sent to [facility] [address] [place], [place] - [address]  [address], [facility] [address]     Phone:  [phone]     amLODIPine 5 MG tablet    lisinopril 40 MG tablet    tadalafil 20 MG tablet                Primary Care Provider " Office Phone # Fax #    Sourav Harding -[phone]       [address] [facility] [address]        Equal Access to Services     [place] : Hadii aad ku hadsnehaamirah Solizethali, wabentonda luqadaha, qajean-pierreta kakimda cleo, charleen ledesma joselinekatie cotton laShiraflor saucedo. So [facility] [phone].    ATENCIÓN: Si habla español, tiene a mckeon disposición servicios gratuitos de asistencia lingüística. Llame al [phone].    We comply with applicable federal civil rights laws and [place] laws. We do not discriminate on the basis of race, color, national origin, age, disability, sex, sexual orientation, or gender identity.            Thank you!     Thank you for choosing [facility]  for your care. Our goal is always to provide you with excellent care. Hearing back from our patients is one way we can continue to improve our services. Please take a few minutes to complete the written survey that you may receive in the mail after your visit with us. Thank you!             Your Updated Medication List - Protect others around you: Learn how to safely use, store and throw away your medicines at www.disposemymeds.org.          This list is accurate as of 5/10/18  7:07 AM.  Always use your most recent med list.                   Brand Name Dispense Instructions for use Diagnosis    amLODIPine 5 MG tablet    NORVASC    90 tablet    Take 1 tablet (5 mg) by mouth daily    Hypertension goal BP (blood pressure) < 140/90       aspirin 81 MG tablet     30 tablet    Take 1 tablet by mouth daily.    HTN (hypertension)       calcium-vitamin D 600-400 MG-UNIT per tablet    CALTRATE    60 tablet    Take 1 tablet by mouth 2 times daily.    Vitamin D deficiency       fish oil-omega-3 fatty acids 1000 MG capsule      take 2 g by mouth daily.        lisinopril 40 MG tablet    PRINIVIL/ZESTRIL    90 tablet    Take 1 tablet (40 mg) by mouth daily    Hypertension goal BP (blood pressure) < 140/90        MULTIVITAMIN TABS   OR      1 TABLET DAILY        tadalafil 20 MG tablet    CIALIS    10 tablet    Take 1 tablet (20 mg) by mouth daily as needed Never use with nitroglycerin, terazosin or doxazosin.    Erectile dysfunction, unspecified erectile dysfunction type

## 2018-05-10 NOTE — NURSING NOTE
Prior to injection verified patient identity using patient's name and date of birth.  Due to injection administration, patient instructed to remain in clinic for 15 minutes  afterwards, and to report any adverse reaction to me immediately.    Screening Questionnaire for Adult Immunization    Are you sick today?   No   Do you have allergies to medications, food, a vaccine component or latex?   No   Have you ever had a serious reaction after receiving a vaccination?   No   Do you have a long-term health problem with heart disease, lung disease, asthma, kidney disease, metabolic disease (e.g. diabetes), anemia, or other blood disorder?   No   Do you have cancer, leukemia, HIV/AIDS, or any other immune system problem?   No   In the past 3 months, have you taken medications that affect  your immune system, such as prednisone, other steroids, or anticancer drugs; drugs for the treatment of rheumatoid arthritis, Crohn s disease, or psoriasis; or have you had radiation treatments?   No   Have you had a seizure, or a brain or other nervous system problem?   No   During the past year, have you received a transfusion of blood or blood     products, or been given immune (gamma) globulin or antiviral drug?   No   For women: Are you pregnant or is there a chance you could become        pregnant during the next month?   No   Have you received any vaccinations in the past 4 weeks?   No     Immunization questionnaire answers were all negative.        Per orders of Dr. Harding, injection of Td given by Bailey Estrella. Patient instructed to remain in clinic for 15 minutes afterwards, and to report any adverse reaction to me immediately.       Screening performed by Bailey Estrella on 5/10/2018 at 7:14 AM.

## 2018-05-10 NOTE — LETTER
[facility]  [address] [place]  [address]  [phone]        May 14, 2018    Jimbo Meyer  [address] [place] [address]        Dear Jimbo,    Your labs are fine.       Results for orders placed or performed in visit on 05/10/18   HIV Antigen Antibody Combo   Result Value Ref Range    HIV Antigen Antibody Combo Nonreactive NR^Nonreactive       Lipid panel reflex to direct LDL Fasting   Result Value Ref Range    Cholesterol 144 <200 mg/dL    Triglycerides 75 <150 mg/dL    HDL Cholesterol 47 >39 mg/dL    LDL Cholesterol Calculated 82 <100 mg/dL    Non HDL Cholesterol 97 <130 mg/dL   Comprehensive metabolic panel   Result Value Ref Range    Sodium 136 133 - 144 mmol/L    Potassium 4.1 3.4 - 5.3 mmol/L    Chloride 103 94 - 109 mmol/L    Carbon Dioxide 26 20 - 32 mmol/L    Anion Gap 7 3 - 14 mmol/L    Glucose 92 70 - 99 mg/dL    Urea Nitrogen 13 7 - 30 mg/dL    Creatinine 0.88 0.66 - 1.25 mg/dL    GFR Estimate 89 >60 mL/min/1.7m2    GFR Estimate If Black >90 >60 mL/min/1.7m2    Calcium 8.9 8.5 - 10.1 mg/dL    Bilirubin Total 0.9 0.2 - 1.3 mg/dL    Albumin 3.8 3.4 - 5.0 g/dL    Protein Total 7.2 6.8 - 8.8 g/dL    Alkaline Phosphatase 57 40 - 150 U/L    ALT 38 0 - 70 U/L    AST 23 0 - 45 U/L   TSH with free T4 reflex   Result Value Ref Range    TSH 1.46 0.40 - 4.00 mU/L   CBC with platelets differential   Result Value Ref Range    WBC 4.3 4.0 - 11.0 10e9/L    RBC Count 4.42 4.4 - 5.9 10e12/L    Hemoglobin 14.4 13.3 - 17.7 g/dL    Hematocrit 41.3 40.0 - 53.0 %    MCV 93 78 - 100 fl    MCH 32.6 26.5 - 33.0 pg    MCHC 34.9 31.5 - 36.5 g/dL    RDW 11.4 10.0 - 15.0 %    Platelet Count 254 150 - 450 10e9/L    Diff Method Automated Method     % Neutrophils 58.9 %    % Lymphocytes 23.5 %    % Monocytes 9.0 %    % Eosinophils 7.4 %    % Basophils 1.2 %    Absolute Neutrophil 2.6 1.6 - 8.3 10e9/L    Absolute Lymphocytes 1.0 0.8 - 5.3 10e9/L    Absolute Monocytes 0.4 0.0 -  1.3 10e9/L    Absolute Eosinophils 0.3 0.0 - 0.7 10e9/L    Absolute Basophils 0.1 0.0 - 0.2 10e9/L   Hemoglobin A1c   Result Value Ref Range    Hemoglobin A1C 5.3 0 - 5.6 %   Prostate spec antigen screen   Result Value Ref Range    PSA 0.46 0 - 4 ug/L       If you have any questions please call the clinic at [phone].    Sincerely,    Sourav Harding MD  [facility]

## 2018-05-10 NOTE — PATIENT INSTRUCTIONS
Schedule eye exam    We will send you lab results    Continue same medications     Keep working on healthy diet/exercise

## 2018-07-02 ENCOUNTER — TRANSFERRED RECORDS (OUTPATIENT)
Dept: HEALTH INFORMATION MANAGEMENT | Facility: CLINIC | Age: 58
End: 2018-07-02

## 2018-07-11 ENCOUNTER — TRANSFERRED RECORDS (OUTPATIENT)
Dept: HEALTH INFORMATION MANAGEMENT | Facility: CLINIC | Age: 58
End: 2018-07-11

## 2018-07-26 ENCOUNTER — TRANSFERRED RECORDS (OUTPATIENT)
Dept: HEALTH INFORMATION MANAGEMENT | Facility: CLINIC | Age: 58
End: 2018-07-26

## 2018-08-09 ENCOUNTER — TRANSFERRED RECORDS (OUTPATIENT)
Dept: HEALTH INFORMATION MANAGEMENT | Facility: CLINIC | Age: 58
End: 2018-08-09

## 2018-10-02 ENCOUNTER — TRANSFERRED RECORDS (OUTPATIENT)
Dept: HEALTH INFORMATION MANAGEMENT | Facility: CLINIC | Age: 58
End: 2018-10-02

## 2018-10-09 ENCOUNTER — TRANSFERRED RECORDS (OUTPATIENT)
Dept: HEALTH INFORMATION MANAGEMENT | Facility: CLINIC | Age: 58
End: 2018-10-09

## 2018-10-18 ENCOUNTER — TELEPHONE (OUTPATIENT)
Dept: FAMILY MEDICINE | Facility: CLINIC | Age: 58
End: 2018-10-18

## 2018-10-18 NOTE — TELEPHONE ENCOUNTER
Forms received from: [facility]   Phone number listed: [phone]   Fax listed: ***  Date received: ***  Form description: ***  Once forms are completed, please return to *** via ***.  Is patient requesting to be contacted when forms are completed: ***  Phone: ***  Form placed: ***  Monica Negro

## 2018-11-12 ENCOUNTER — OFFICE VISIT (OUTPATIENT)
Dept: FAMILY MEDICINE | Facility: CLINIC | Age: 58
End: 2018-11-12
Payer: COMMERCIAL

## 2018-11-12 VITALS
DIASTOLIC BLOOD PRESSURE: 88 MMHG | TEMPERATURE: 98.1 F | HEART RATE: 69 BPM | WEIGHT: 185.5 LBS | SYSTOLIC BLOOD PRESSURE: 134 MMHG | BODY MASS INDEX: 30.81 KG/M2

## 2018-11-12 DIAGNOSIS — Z01.818 PREOP GENERAL PHYSICAL EXAM: Primary | ICD-10-CM

## 2018-11-12 DIAGNOSIS — M54.10 RADICULAR PAIN OF LEFT LOWER EXTREMITY: ICD-10-CM

## 2018-11-12 DIAGNOSIS — Z01.818 PRE-OP EXAM: Primary | ICD-10-CM

## 2018-11-12 LAB
ALBUMIN SERPL-MCNC: 3.7 G/DL (ref 3.4–5)
ALP SERPL-CCNC: 53 U/L (ref 40–150)
ALT SERPL W P-5'-P-CCNC: 35 U/L (ref 0–70)
ANION GAP SERPL CALCULATED.3IONS-SCNC: 6 MMOL/L (ref 3–14)
AST SERPL W P-5'-P-CCNC: 18 U/L (ref 0–45)
BASOPHILS # BLD AUTO: 0.1 10E9/L (ref 0–0.2)
BASOPHILS NFR BLD AUTO: 1.1 %
BILIRUB SERPL-MCNC: 0.9 MG/DL (ref 0.2–1.3)
BUN SERPL-MCNC: 16 MG/DL (ref 7–30)
CALCIUM SERPL-MCNC: 9 MG/DL (ref 8.5–10.1)
CHLORIDE SERPL-SCNC: 104 MMOL/L (ref 94–109)
CO2 SERPL-SCNC: 27 MMOL/L (ref 20–32)
CREAT SERPL-MCNC: 0.84 MG/DL (ref 0.66–1.25)
DIFFERENTIAL METHOD BLD: NORMAL
EOSINOPHIL # BLD AUTO: 0.3 10E9/L (ref 0–0.7)
EOSINOPHIL NFR BLD AUTO: 6.6 %
ERYTHROCYTE [DISTWIDTH] IN BLOOD BY AUTOMATED COUNT: 11.7 % (ref 10–15)
GFR SERPL CREATININE-BSD FRML MDRD: >90 ML/MIN/1.7M2
GLUCOSE SERPL-MCNC: 97 MG/DL (ref 70–99)
HCT VFR BLD AUTO: 41.8 % (ref 40–53)
HGB BLD-MCNC: 14.7 G/DL (ref 13.3–17.7)
LYMPHOCYTES # BLD AUTO: 1.2 10E9/L (ref 0.8–5.3)
LYMPHOCYTES NFR BLD AUTO: 25.4 %
MCH RBC QN AUTO: 32.7 PG (ref 26.5–33)
MCHC RBC AUTO-ENTMCNC: 35.2 G/DL (ref 31.5–36.5)
MCV RBC AUTO: 93 FL (ref 78–100)
MONOCYTES # BLD AUTO: 0.5 10E9/L (ref 0–1.3)
MONOCYTES NFR BLD AUTO: 10.5 %
NEUTROPHILS # BLD AUTO: 2.6 10E9/L (ref 1.6–8.3)
NEUTROPHILS NFR BLD AUTO: 56.4 %
PLATELET # BLD AUTO: 281 10E9/L (ref 150–450)
POTASSIUM SERPL-SCNC: 4.4 MMOL/L (ref 3.4–5.3)
PROT SERPL-MCNC: 6.8 G/DL (ref 6.8–8.8)
RBC # BLD AUTO: 4.49 10E12/L (ref 4.4–5.9)
SODIUM SERPL-SCNC: 137 MMOL/L (ref 133–144)
WBC # BLD AUTO: 4.6 10E9/L (ref 4–11)

## 2018-11-12 PROCEDURE — 36415 COLL VENOUS BLD VENIPUNCTURE: CPT | Performed by: FAMILY MEDICINE

## 2018-11-12 PROCEDURE — 85025 COMPLETE CBC W/AUTO DIFF WBC: CPT | Performed by: FAMILY MEDICINE

## 2018-11-12 PROCEDURE — 99215 OFFICE O/P EST HI 40 MIN: CPT | Performed by: FAMILY MEDICINE

## 2018-11-12 PROCEDURE — 93005 ELECTROCARDIOGRAM TRACING: CPT | Performed by: FAMILY MEDICINE

## 2018-11-12 PROCEDURE — 80053 COMPREHEN METABOLIC PANEL: CPT | Performed by: FAMILY MEDICINE

## 2018-11-12 PROCEDURE — 93010 ELECTROCARDIOGRAM REPORT: CPT | Performed by: INTERNAL MEDICINE

## 2018-11-12 RX ORDER — CYCLOBENZAPRINE HCL 5 MG
5 TABLET ORAL 3 TIMES DAILY PRN
Qty: 24 TABLET | Refills: 0 | Status: SHIPPED | OUTPATIENT
Start: 2018-11-12 | End: 2021-02-23

## 2018-11-12 ASSESSMENT — PAIN SCALES - GENERAL: PAINLEVEL: NO PAIN (0)

## 2018-11-12 NOTE — MR AVS SNAPSHOT
After Visit Summary   11/12/2018    Jimbo Meyer    MRN: [id number]           Patient Information     Date Of Birth          1960        Visit Information        Provider Department      11/12/2018 6:40 AM Sourav Harding MD [facility]        Today's Diagnoses     Preop general physical exam    -  1    Radicular pain of left lower extremity          Care Instructions      Before Your Surgery      Call your surgeon if there is any change in your health. This includes signs of a cold or flu (such as a sore throat, runny nose, cough, rash or fever).    Do not smoke, drink alcohol or take over the counter medicine (unless your surgeon or primary care doctor tells you to) for the 24 hours before and after surgery.    If you take prescribed drugs: Follow your doctor s orders about which medicines to take and which to stop until after surgery.    Eating and drinking prior to surgery: follow the instructions from your surgeon    Take a shower or bath the night before surgery. Use the soap your surgeon gave you to gently clean your skin. If you do not have soap from your surgeon, use your regular soap. Do not shave or scrub the surgery site.  Wear clean pajamas and have clean sheets on your bed.       Hold aspirin, fish oil, ibuprofen, naproxen type meds for one week prior to procedure    Okay to take tylenol ( acetaminophen ) that week    Could try the muscle relaxer as needed , but watch for sedation    The morning of procedure, take the amlodipine but hold lisinopril              Follow-ups after your visit        Follow-up notes from your care team     Return if symptoms worsen or fail to improve.      Your next 10 appointments already scheduled     Nov 16, 2018  4:00 PM CST   Ech Complete with [id number]   [facility]    [address] [place] [address]   [phone]           1.  Please bring or  "wear a comfortable two-piece outfit. 2.  You may eat, drink and take your normal medicines. 3.  For any questions that cannot be answered, please contact the ordering physician 4.  Please do not wear perfumes or scented lotions on the day of your exam.              Future tests that were ordered for you today     Open Future Orders        Priority Expected Expires Ordered    Echocardiogram Complete Routine  2019            Who to contact     If you have questions or need follow up information about today's clinic visit or your schedule please contact [facility] directly at [phone].  Normal or non-critical lab and imaging results will be communicated to you by [employer], letter or phone within 4 business days after the clinic has received the results. If you do not hear from us within 7 days, please contact the clinic through [employer] or phone. If you have a critical or abnormal lab result, we will notify you by phone as soon as possible.  Submit refill requests through [employer] or call your pharmacy and they will forward the refill request to us. Please allow 3 business days for your refill to be completed.          Additional Information About Your Visit        MyChart Information     [employer] lets you send messages to your doctor, view your test results, renew your prescriptions, schedule appointments and more. To sign up, go to [email or web address] . Click on \"Log in\" on the left side of the screen, which will take you to the Welcome page. Then click on \"Sign up Now\" on the right side of the page.     You will be asked to enter the access code listed below, as well as some personal information. Please follow the directions to create your username and password.     Your access code is: [email or web address]  Expires: 2/10/2019  7:20 AM     Your access code will  in 90 days. If you need help or a new code, please call your [facility] or [phone].        Care " EveryWhere ID     This is your Care EveryWhere ID. This could be used by other organizations to access your [facility] medical records  [id number]        Your Vitals Were     Pulse Temperature BMI (Body Mass Index)             69 98.1  F (36.7  C) (Oral) 30.81 kg/m2          Blood Pressure from Last 3 Encounters:   11/12/18 134/88   05/10/18 125/89   05/17/17 134/82    Weight from Last 3 Encounters:   11/12/18 185 lb 8 oz (84.1 kg)   05/10/18 177 lb 6 oz (80.5 kg)   05/17/17 178 lb (80.7 kg)              We Performed the Following     CBC with platelets differential     Comprehensive metabolic panel     EKG 12-lead, tracing only          Today's Medication Changes          These changes are accurate as of 11/12/18  7:54 AM.  If you have any questions, ask your nurse or doctor.               Start taking these medicines.        Dose/Directions    cyclobenzaprine 5 MG tablet   Commonly known as:  FLEXERIL   Used for:  Preop general physical exam   Started by:  Sourav Harding MD        Dose:  5 mg   Take 1 tablet (5 mg) by mouth 3 times daily as needed for muscle spasms   Quantity:  24 tablet   Refills:  0            Where to get your medicines      These medications were sent to [facility] [address]  [address] [facility] [address], [facility] [address]     Phone:  [phone]     cyclobenzaprine 5 MG tablet                Primary Care Provider Office Phone # Fax #    Sourav Harding -[phone]       [address] [facility] [address]        Equal Access to Services     [facility]DRA [facility] : Hadii maribell jarao Soradha, waaxda luqadaha, qaybta kaalmada cleo, waxmai idijihan saucedo. So [facility] [phone].    ATENCIÓN: Si habla español, tiene a mckeon disposición servicios gratuitos de asistencia lingüística. Llame al [phone].    We comply with applicable federal civil rights laws and [place] laws. We do not discriminate on  the basis of race, color, national origin, age, disability, sex, sexual orientation, or gender identity.            Thank you!     Thank you for choosing [facility]  for your care. Our goal is always to provide you with excellent care. Hearing back from our patients is one way we can continue to improve our services. Please take a few minutes to complete the written survey that you may receive in the mail after your visit with us. Thank you!             Your Updated Medication List - Protect others around you: Learn how to safely use, store and throw away your medicines at www.disposemymeds.org.          This list is accurate as of 11/12/18  7:54 AM.  Always use your most recent med list.                   Brand Name Dispense Instructions for use Diagnosis    amLODIPine 5 MG tablet    NORVASC    90 tablet    Take 1 tablet (5 mg) by mouth daily    Hypertension goal BP (blood pressure) < 140/90       aspirin 81 MG tablet     30 tablet    Take 1 tablet by mouth daily.    HTN (hypertension)       calcium carbonate 600 mg-vitamin D 400 units 600-400 MG-UNIT per tablet    CALTRATE    60 tablet    Take 1 tablet by mouth 2 times daily.    Vitamin D deficiency       cyclobenzaprine 5 MG tablet    FLEXERIL    24 tablet    Take 1 tablet (5 mg) by mouth 3 times daily as needed for muscle spasms    Preop general physical exam       fish oil-omega-3 fatty acids 1000 MG capsule      take 2 g by mouth daily.        lisinopril 40 MG tablet    PRINIVIL/ZESTRIL    90 tablet    Take 1 tablet (40 mg) by mouth daily    Hypertension goal BP (blood pressure) < 140/90       MULTIVITAMIN TABS   OR      1 TABLET DAILY        tadalafil 20 MG tablet    CIALIS    10 tablet    Take 1 tablet (20 mg) by mouth daily as needed Never use with nitroglycerin, terazosin or doxazosin.    Erectile dysfunction, unspecified erectile dysfunction type

## 2018-11-12 NOTE — PROGRESS NOTES
[address] [address] [phone]  Dept: [phone]    PRE-OP EVALUATION:  Today's date: 2018    Jimbo Meyer (: 1960) presents for pre-operative evaluation assessment as requested by Dr. Thapa.  He requires evaluation and anesthesia risk assessment prior to undergoing surgery/procedure for treatment of back pain .    Fax number for surgical facility: [phone]  Primary Physician: Sourav Harding  Type of Anesthesia Anticipated: General    Patient has a Health Care Directive or Living Will:  NO    Preop Questions 2018   Who is doing your surgery? dr Samir Thapa   What are you having done? Laminotomy &cyst Exclusion   Date of Surgery/Procedure:    1.  Do you have a history of Heart attack, stroke, stent, coronary bypass surgery, or other heart surgery? No   2.  Do you ever have any pain or discomfort in your chest? No   3.  Do you have a history of  Heart Failure? No   4.   Are you troubled by shortness of breath when:  walking on a level surface, or up a slight hill, or at night? No   5.  Do you currently have a cold, bronchitis or other respiratory infection? No   6.  Do you have a cough, shortness of breath, or wheezing? No   7.  Do you sometimes get pains in the calves of your legs when you walk? No   8. Do you or anyone in your family have previous history of blood clots? No   9.  Do you or does anyone in your family have a serious bleeding problem such as prolonged bleeding following surgeries or cuts? No   10. Have you ever had problems with anemia or been told to take iron pills? No   11. Have you had any abnormal blood loss such as black, tarry or bloody stools? No   12. Have you ever had a blood transfusion? No   13. Have you or any of your relatives ever had problems with anesthesia? No   14. Do you have sleep apnea, excessive snoring or daytime drowsiness? No   15. Do you have any prosthetic  heart valves? No   16. Do you have prosthetic joints? No         HPI:     HPI related to upcoming procedure: 58 year old with back and left leg pain for several months.  To have surgery later this month.  Has spinal cyst.           MEDICAL HISTORY:     Patient Active Problem List    Diagnosis Date Noted     Advanced care planning/counseling discussion 02/28/2017     Priority: Medium     Hyperlipidemia LDL goal <100 02/28/2017     Priority: Medium     Erectile dysfunction, unspecified erectile dysfunction type 01/08/2016     Priority: Medium     Hypertension goal BP (blood pressure) < 140/90 11/23/2010     Priority: Medium      Past Medical History:   Diagnosis Date     HTN (hypertension)      Past Surgical History:   Procedure Laterality Date     COLONOSCOPY  6-20-10    Normal. Repeat in 10 yrs.     Current Outpatient Prescriptions   Medication Sig Dispense Refill     amLODIPine (NORVASC) 5 MG tablet Take 1 tablet (5 mg) by mouth daily 90 tablet 3     aspirin 81 MG tablet Take 1 tablet by mouth daily. 30 tablet 3     calcium-vitamin D (CALTRATE) 600-400 MG-UNIT per tablet Take 1 tablet by mouth 2 times daily. 60 tablet 3     fish oil-omega-3 fatty acids (FISH OIL) 1000 MG capsule take 2 g by mouth daily.       lisinopril (PRINIVIL/ZESTRIL) 40 MG tablet Take 1 tablet (40 mg) by mouth daily 90 tablet 3     MULTIVITAMIN TABS   OR 1 TABLET DAILY       tadalafil (CIALIS) 20 MG tablet Take 1 tablet (20 mg) by mouth daily as needed Never use with nitroglycerin, terazosin or doxazosin. 10 tablet 6     OTC products: None, except as noted above    Allergies   Allergen Reactions     Hctz Diarrhea      Latex Allergy: NO    Social History   Substance Use Topics     Smoking status: Never Smoker     Smokeless tobacco: Never Used     Alcohol use Yes      Comment: occassionally drinks beer 2-4 a week     History   Drug Use No       REVIEW OF SYSTEMS:   Constitutional, neuro, ENT, endocrine, pulmonary, cardiac, gastrointestinal,  genitourinary, musculoskeletal, integument and psychiatric systems are negative, except as otherwise noted.    No recent illnesses      EXAM:   /88 (BP Location: Left arm, Patient Position: Chair, Cuff Size: Adult Regular)  Pulse 69  Temp 98.1  F (36.7  C) (Oral)  Wt 185 lb 8 oz (84.1 kg)  BMI 30.81 kg/m2    GENERAL APPEARANCE: healthy, alert and no distress     EYES: EOMI,  PERRL     HENT: ear canals and TM's normal and nose and mouth without ulcers or lesions     NECK: no adenopathy, no asymmetry, masses, or scars and thyroid normal to palpation     RESP: lungs clear to auscultation - no rales, rhonchi or wheezes     CV: regular rates and rhythm, normal S1 S2, no S3 or S4 and no murmur, click or rub     ABDOMEN:  soft, nontender, no HSM or masses and bowel sounds normal     MS: extremities normal- no gross deformities noted, no evidence of inflammation in joints, FROM in all extremities.     SKIN: no suspicious lesions or rashes     NEURO: Normal strength and tone, sensory exam grossly normal, mentation intact and speech normal     PSYCH: mentation appears normal. and affect normal/bright     LYMPHATICS: No cervical adenopathy    DIAGNOSTICS:   ekg done today; see tracing    Labs drawn, pending        Recent Labs   Lab Test  05/10/18   0716  02/28/17   0709   HGB  14.4  14.6   PLT  254  268   NA  136  135   POTASSIUM  4.1  4.1   CR  0.88  0.79   A1C  5.3  5.0        IMPRESSION:   Reason for surgery/procedure: mainly left leg radiculopathy pain due to cyst in spine.  Diagnosis/reason for consult: preop clearance     The proposed surgical procedure is considered INTERMEDIATE risk.    REVISED CARDIAC RISK INDEX  The patient has the following serious cardiovascular risks for perioperative complications such as (MI, PE, VFib and 3  AV Block):  No serious cardiac risks  INTERPRETATION: 0 risks: Class I (very low risk - 0.4% complication rate)    The patient has the following additional risks for perioperative  complications:  No identified additional risks      ICD-10-CM    1. Preop general physical exam Z01.818        RECOMMENDATIONS:          --Patient is to take all scheduled medications on the day of surgery EXCEPT for modifications listed below.    Patient will take the amlodipine the morning of surgery but hold the lisinopril.    ( want to avoid acei - induced hypotension during procedure ).    Hold aspirin for one week prior.     Patient will hold ibuprofen/ naproxen type meds for one week prior also.  Same with fish oil.    Patient can take tylenol ( acetaminophen ) prior to procedure.    Patient given a few cyclobenzaprine to take mainly at night as needed for pain.  He states the tramadol from the surgeon did not do much for the pain.    No cardiac history or symptoms.    Of note, today's ekg shows no acute ischemia.  The computer read as abnormal.  Thus to clarify will obtain ultrasound of heart ( echo ).       APPROVAL GIVEN to proceed with proposed procedure, without further diagnostic evaluation, providing labs and echo are okay.       Signed Electronically by: Sourav Harding MD    Copy of this evaluation report is provided to requesting physician.    [facility] Preop Guidelines    Revised Cardiac Risk Index

## 2018-11-12 NOTE — PATIENT INSTRUCTIONS
Before Your Surgery      Call your surgeon if there is any change in your health. This includes signs of a cold or flu (such as a sore throat, runny nose, cough, rash or fever).    Do not smoke, drink alcohol or take over the counter medicine (unless your surgeon or primary care doctor tells you to) for the 24 hours before and after surgery.    If you take prescribed drugs: Follow your doctor s orders about which medicines to take and which to stop until after surgery.    Eating and drinking prior to surgery: follow the instructions from your surgeon    Take a shower or bath the night before surgery. Use the soap your surgeon gave you to gently clean your skin. If you do not have soap from your surgeon, use your regular soap. Do not shave or scrub the surgery site.  Wear clean pajamas and have clean sheets on your bed.       Hold aspirin, fish oil, ibuprofen, naproxen type meds for one week prior to procedure    Okay to take tylenol ( acetaminophen ) that week    Could try the muscle relaxer as needed , but watch for sedation    The morning of procedure, take the amlodipine but hold lisinopril

## 2018-11-13 NOTE — PROGRESS NOTES
Your pre-op labs are fine.    Sourav Harding MD    [facility]- please fax to the pre-op number listed on H&P.  Thanks  Sourav Harding MD

## 2018-11-16 ENCOUNTER — RADIANT APPOINTMENT (OUTPATIENT)
Dept: CARDIOLOGY | Facility: CLINIC | Age: 58
End: 2018-11-16
Attending: FAMILY MEDICINE
Payer: COMMERCIAL

## 2018-11-16 DIAGNOSIS — Z01.818 PREOP GENERAL PHYSICAL EXAM: ICD-10-CM

## 2018-11-16 PROCEDURE — 93306 TTE W/DOPPLER COMPLETE: CPT | Mod: GC | Performed by: INTERNAL MEDICINE

## 2018-11-19 ENCOUNTER — TELEPHONE (OUTPATIENT)
Dept: FAMILY MEDICINE | Facility: CLINIC | Age: 58
End: 2018-11-19

## 2018-11-19 NOTE — TELEPHONE ENCOUNTER
Attempt # 1  Called patient at home [phone]  Was call answered? Yes, relayed below message to patient who verbalized understanding and agreement with plan      Lexi Jeter RN  [facility]

## 2018-11-19 NOTE — PROGRESS NOTES
Your heart ultrasound was normal.  Okay to proceed with surgery.    Sourav Harding MD    [facility] - please fax to number listed on H&P  Thanks  Sourav Harding MD

## 2018-11-19 NOTE — TELEPHONE ENCOUNTER
Please call patient to inform him the heart ultrasound was fine, so he is okay to proceed with the surgery.    Also I did result note    Thanks    Sourav Harding MD

## 2018-11-19 NOTE — TELEPHONE ENCOUNTER
Attempt # 1  Called patient at home [phone]  Was call answered?  No answer, left message to call nurse line at [phone]    Lexi Jeter RN  [facility]

## 2018-11-19 NOTE — TELEPHONE ENCOUNTER
Patient returned call to RN line at 9:12 am and left message for call back to him.  Sophie Landrum RN  [facility]

## 2018-11-20 ENCOUNTER — TELEPHONE (OUTPATIENT)
Dept: FAMILY MEDICINE | Facility: CLINIC | Age: 58
End: 2018-11-20

## 2018-11-20 NOTE — TELEPHONE ENCOUNTER
Reason for Call:  Other Pre-Op Report    Detailed comments: [facility], requests pre op report. Patient is having surgery 11/21/2018. Please fax report to [phone]. Call with any questions.     Phone Number Patient can be reached at: Other phone number:  [phone]    Best Time: as soon as possible.     Can we leave a detailed message on this number? YES    Call taken on 11/20/2018 at 1:52 PM by Dolores Hwang

## 2018-11-21 ENCOUNTER — TRANSFERRED RECORDS (OUTPATIENT)
Dept: HEALTH INFORMATION MANAGEMENT | Facility: CLINIC | Age: 58
End: 2018-11-21

## 2018-11-21 NOTE — TELEPHONE ENCOUNTER
Reason for Call:  Other - Requesting Pre Op    Detailed comments: June from [facility] called and stated patient will be there at 8:15 am today for surgery and they have not yet received the pre op, EKG, and Labs. She asked if this could be faxed as soon as possible to [phone].    Phone Number Patient can be reached at: [facility]: [phone]    Best Time: As soon as possible    Can we leave a detailed message on this number? YES    Call taken on 11/21/2018 at 7:41 AM by Lilian Thomason

## 2019-02-25 ENCOUNTER — TRANSFERRED RECORDS (OUTPATIENT)
Dept: HEALTH INFORMATION MANAGEMENT | Facility: CLINIC | Age: 59
End: 2019-02-25

## 2019-04-03 ENCOUNTER — OFFICE VISIT (OUTPATIENT)
Dept: FAMILY MEDICINE | Facility: CLINIC | Age: 59
End: 2019-04-03
Payer: COMMERCIAL

## 2019-04-03 VITALS
DIASTOLIC BLOOD PRESSURE: 72 MMHG | WEIGHT: 180 LBS | BODY MASS INDEX: 29.9 KG/M2 | TEMPERATURE: 97 F | SYSTOLIC BLOOD PRESSURE: 125 MMHG | OXYGEN SATURATION: 99 % | HEART RATE: 77 BPM

## 2019-04-03 DIAGNOSIS — L05.01 PILONIDAL CYST WITH ABSCESS: Primary | ICD-10-CM

## 2019-04-03 PROCEDURE — 10080 I&D PILONIDAL CYST SIMPLE: CPT | Performed by: FAMILY MEDICINE

## 2019-04-03 RX ORDER — CLINDAMYCIN HCL 300 MG
300 CAPSULE ORAL 3 TIMES DAILY
Qty: 30 CAPSULE | Refills: 0 | Status: SHIPPED | OUTPATIENT
Start: 2019-04-03 | End: 2019-04-13

## 2019-04-03 NOTE — PROGRESS NOTES
SUBJECTIVE:   Jimbo Meyer is a 58 year old male who presents to clinic today for the following health issues:  Lump on tailbone x 2 days   Patient comes in with painful lump at the tailbone he reports been present for the past 2 days or so.  He reports tender when he sits however he has no leakage no drainage denies any fever or chills.  He denies any other previous lump has no previous skin infection no history of MRSA    Problem list and histories reviewed & adjusted, as indicated.  Additional history: as documented    Patient Active Problem List   Diagnosis     Hypertension goal BP (blood pressure) < 140/90     Erectile dysfunction, unspecified erectile dysfunction type     Advanced care planning/counseling discussion     Hyperlipidemia LDL goal <100     Past Surgical History:   Procedure Laterality Date     COLONOSCOPY  6-20-10    Normal. Repeat in 10 yrs.       Social History     Tobacco Use     Smoking status: Never Smoker     Smokeless tobacco: Never Used   Substance Use Topics     Alcohol use: Yes     Comment: occassionally drinks beer 2-4 a week     Family History   Problem Relation Age of Onset     C.A.D. Father         age 55     Other - See Comments Mother         unknown, 76 yo     Cancer No family hx of      Diabetes No family hx of          Current Outpatient Medications   Medication Sig Dispense Refill     amLODIPine (NORVASC) 5 MG tablet Take 1 tablet (5 mg) by mouth daily 90 tablet 3     aspirin 81 MG tablet Take 1 tablet by mouth daily. 30 tablet 3     calcium-vitamin D (CALTRATE) 600-400 MG-UNIT per tablet Take 1 tablet by mouth 2 times daily. 60 tablet 3     clindamycin (CLEOCIN) 300 MG capsule Take 1 capsule (300 mg) by mouth 3 times daily for 10 days 30 capsule 0     cyclobenzaprine (FLEXERIL) 5 MG tablet Take 1 tablet (5 mg) by mouth 3 times daily as needed for muscle spasms 24 tablet 0     fish oil-omega-3 fatty acids (FISH OIL) 1000 MG capsule take 2 g by mouth daily.       lisinopril  (PRINIVIL/ZESTRIL) 40 MG tablet Take 1 tablet (40 mg) by mouth daily 90 tablet 3     MULTIVITAMIN TABS   OR 1 TABLET DAILY       tadalafil (CIALIS) 20 MG tablet Take 1 tablet (20 mg) by mouth daily as needed Never use with nitroglycerin, terazosin or doxazosin. 10 tablet 6     Allergies   Allergen Reactions     Hctz Diarrhea       Reviewed and updated as needed this visit by clinical staff  Tobacco  Allergies  Meds  Med Hx  Surg Hx  Fam Hx  Soc Hx      Reviewed and updated as needed this visit by Provider         ROS:  Constitutional, HEENT, cardiovascular, pulmonary, gi and gu systems are negative, except as otherwise noted.    OBJECTIVE:     /72 (BP Location: Right arm, Patient Position: Sitting, Cuff Size: Adult Regular)   Pulse 77   Temp 97  F (36.1  C) (Oral)   Wt 81.6 kg (180 lb)   SpO2 99%   BMI 29.90 kg/m    Body mass index is 29.9 kg/m .  GENERAL: healthy, alert and no distress  RECTAL (male): Positive for pilonidal cyst, larger on right inner buttock area.   Mild tenderness and swelling, skin red, no leakage.  Diagnostic Test Results:  none     ASSESSMENT/PLAN:       ICD-10-CM    1. Pilonidal cyst with abscess L05.01 clindamycin (CLEOCIN) 300 MG capsule     DRAIN PILONIDAL CYST SIMPLE   After explained the procedure to the patient, then I  obtained a written consent.  The area of cyst was cleaned with Betadine 3 times.  I used an 11 inch blade and made a vertical incision to the right buttock cyst area.  I was able to squeeze significant amount of bloody whitish discharge.  Afterwards, I covered with pressure gauze     He was given instruction to keep it clean and dry try to apply warm compression a few times throughout the day and try to squeeze see if there is any more pus comes out.  In addition I given prescription for clindamycin use as been prescribed.    Was advised to follow-up in the clinic in 2 weeks.    See Patient Instructions    Abdoulaye Vo MD  [facility]  [facility]

## 2019-04-03 NOTE — PATIENT INSTRUCTIONS
Patient Education     Pilonidal Cyst, Infected (Antibiotic Treatment)  A pilonidal cyst is a swelling that starts under the skin on the sacrum near the tailbone. It may look like a small dimple. It can fill with skin oils, hair, and dead skin cells. It may stay small or grow larger. It may become infected with normal skin bacteria because it often has an opening to the surface.  Causes  The cause of pilonidal cysts has been debated since they were first recognized. A cyst may be present at birth and go unnoticed. Injury, rubbing, or skin irritation may also cause pilonidal cysts. It can also be caused by an ingrown hair. The cause is most likely a combination of these things. Because some injury or irritation can lead to pilonidal cysts, they can be more common in people who sit or drive a lot for work.  Symptoms  A pilonidal cyst may be small and painless. If it becomes inflamed or infected, you may have these symptoms:    Swelling    Irritation or redness    Pain    Drainage  The cyst can swell and drain on its own. The swelling and drainage can come and go.  Treatment  A limited infection can be treated with antibiotics and home care. You have been given antibiotics to treat your infected pilonidal cyst.  Home care  The following guidelines will help you care for your wound at home:    Sit in a tub filled with about 6 inches of hot water. Keep the water hot for 10 to 15 minutes.    Don't squeeze the pilonidal cyst or stick a needle in it to drain it. This will make the infection worse, or spread it.    Cover the cyst with a pad or something similar to keep it from becoming more irritated, damaged, and painful.  Medicines    Take acetaminophen or ibuprofen for pain, unless you were given a different pain medicine to use. Talk with your doctor before using these medicines if you have chronic liver or kidney disease, or have ever had a stomach ulcer or digestive bleeding. Also talk with your doctor if you are  taking blood thinner medicines.    Take the antibiotics that you were prescribed until they are all gone. To make sure the infection is cured, it is important to finish the antibiotics even if the wound looks better.    Use antibiotic cream or ointment if your healthcare provider tells you to.    Preventing future infections  Once this infection has healed, follow these tips to lower the risk for another infection:    Keep the area of the cyst clean by bathing or showering every day.    Don't wear tight-fitting clothing. This will help lessen sweat and irritation of the skin.    You may need surgery to completely remove the cyst if it keeps coming back. The surgery can only be done when the cyst is not infected. Ask your doctor for more information.    Watch for signs of infection listed below so that treatment may be started early.  Follow-up care  Follow up with your healthcare provider, or as advised. Check your wound every day for the signs listed below.  When to seek medical advice  Call your healthcare provider right away if any of these occur:    Pus coming from the cyst    Increasing local pain, redness, or swelling    Fever of 100.4 F (38.0 C) or higher for more than 2 days, or as directed by your healthcare provider  Date Last Reviewed: 12/1/2016 2000-2018 [employer]. [address] [place] [address]. All rights reserved. This information is not intended as a substitute for professional medical care. Always follow your healthcare professional's instructions.           Patient Education     Infected Pilonidal Cyst (Incision & Drainage)  A pilonidal cyst is a swelling that starts under the skin on the sacrum near the tail bone. It may look like a small dimple. It can fill with skin oils, hair, and dead skin cells. It may stay small or grow larger. Because it often has an opening to the surface, it may become infected with normal skin bacteria.  Cause  The cause of pilonidal cysts  has been debated since they were first recognized. It may be present at birth and go unnoticed. Injury, rubbing, or skin irritation may also cause pilonidal cysts. It can also be caused by an ingrown hair. Most likely, the cause is a combination of these things. Because some injury or irritation can lead to pilonidal cysts, it can be more common in people who sit or drive a lot for work.  Symptoms  A pilonidal cyst may be small and painless. If it is inflamed or infected, you may have these symptoms:    Swelling    Irritation or redness    Pain    Drainage  The cyst can swell and drain on its own. The swelling and drainage can come and go.  Treatment  Your pilonidal cyst was drained with a small incision using local anesthesia.  After the incision and drainage, gauze packing may be inserted into the opening. If so, it should be removed in 1 to 2 days. Antibiotics are not required in the treatment of a simple abscess, unless the infection is spreading into the skin around the wound. The wound will take about 1 to 2 weeks to heal depending on the size of the cyst.  Home care  Wound care    Pus may drain from the wound for the first few days. Cover the wound with a clean dry bandage. Change the bandage if it becomes soaked with blood or pus, or if it gets soiled with feces or urine.    Sit in a tub filled with about 6 inches of hot water. Keep the water hot for 10 to15 minutes.    If gauze packing was placed inside the cyst cavity, you may be told to remove it yourself. You may do this in the shower. Once the packing is removed, you should wash the area carefully in the shower once a day. Do this until the skin opening has closed. It is okay to direct the shower spray directly into the opening if this is not too painful.  Medicines    Take acetaminophen or ibuprofen for pain, unless you were given a different pain medicine to use. Talk with your doctor before using these medicines if you have chronic liver or kidney  disease or have ever had a stomach ulcer or gastrointestinal bleeding. Also talk with your doctor if you are taking blood thinner medicines.    If you were given antibiotics, take them until they are gone. It is important to finish the antibiotics even if the wound looks better. This is to make sure the infection has cleared.    Use antibiotic cream or ointment if your healthcare provider tells you to do so.  Prevention  Once this infection has healed, the following may decrease the risk of future infections:    Keep the area of the cyst clean by bathing or showering daily.    Avoid tight-fitting clothing to minimize perspiration and irritation of the skin.    Recurrent pilonidal cysts may be completely removed by surgery. But this can only be done at a time when there is no infection. Ask your doctor for more information.  Follow-up care  Follow up with your healthcare provider, or as advised. If a gauze packing was inserted in your wound, it should be removed in 1 to 2 days. Check your wound every day for the signs listed below.  When to seek medical advice  Call your healthcare provider right away if any of these occur:    Pus continues to come from the cyst for 5 days after the incision    Increasing redness, local pain, or swelling    Fever of 100.4 F (38.0 C) or higher for more than 2 days, or as directed by your healthcare provider  Date Last Reviewed: 12/1/2016 2000-2018 [employer]. [address] [place] [address], [place] [address]. All rights reserved. This information is not intended as a substitute for professional medical care. Always follow your healthcare professional's instructions.

## 2019-06-29 DIAGNOSIS — N52.9 ERECTILE DYSFUNCTION, UNSPECIFIED ERECTILE DYSFUNCTION TYPE: ICD-10-CM

## 2019-06-29 DIAGNOSIS — I10 HYPERTENSION GOAL BP (BLOOD PRESSURE) < 140/90: ICD-10-CM

## 2019-07-01 RX ORDER — AMLODIPINE BESYLATE 5 MG/1
TABLET ORAL
Qty: 90 TABLET | Refills: 0 | Status: SHIPPED | OUTPATIENT
Start: 2019-07-01 | End: 2019-10-02

## 2019-07-01 RX ORDER — TADALAFIL 20 MG/1
TABLET ORAL
Qty: 10 TABLET | Refills: 3 | Status: SHIPPED | OUTPATIENT
Start: 2019-07-01 | End: 2020-03-30

## 2019-07-01 NOTE — TELEPHONE ENCOUNTER
Prescription approved per [facility] Protocol.    Sophie Landrum RN  [facility]

## 2019-07-01 NOTE — TELEPHONE ENCOUNTER
"Requested Prescriptions   Pending Prescriptions Disp Refills     tadalafil (CIALIS) 20 MG tablet [Pharmacy Med Name: TADALAFIL 20MG      TAB] 6 tablet 11     Sig: TAKE 1 TABLET BY MOUTH ONCE DAILY AS NEEDED NEVER  USE  WITH  NITROGLYCERIN,  TERAZOSIN  OR  DOXAZOSIN   Last Written Prescription Date:  5-10-18  Last Fill Quantity: 10,  # refills: 6   Last office visit: 4/3/2019 with prescribing provider:  11-12-18   Future Office Visit:        Erectile Dysfuction Protocol Passed - 6/29/2019  9:29 AM        Passed - Absence of nitrates on medication list        Passed - Absence of Alpha Blockers on Med list        Passed - Recent (12 mo) or future (30 days) visit within the authorizing provider's specialty     Patient had office visit in the last 12 months or has a visit in the next 30 days with authorizing provider or within the authorizing provider's specialty.  See \"Patient Info\" tab in inbasket, or \"Choose Columns\" in Meds & Orders section of the refill encounter.              Passed - Medication is active on med list        Passed - Patient is age 18 or older        amLODIPine (NORVASC) 5 MG tablet [Pharmacy Med Name: AMLODIPINE 5MG TAB] 90 tablet 3     Sig: TAKE 1 TABLET BY MOUTH ONCE DAILY   Last Written Prescription Date:  5-10-18  Last Fill Quantity: 960,  # refills: 3   Last office visit: 4/3/2019 with prescribing provider:     Future Office Visit:        Calcium Channel Blockers Protocol  Passed - 6/29/2019  9:29 AM        Passed - Blood pressure under 140/90 in past 12 months     BP Readings from Last 3 Encounters:   04/03/19 125/72   11/12/18 134/88   05/10/18 125/89                 Passed - Recent (12 mo) or future (30 days) visit within the authorizing provider's specialty     Patient had office visit in the last 12 months or has a visit in the next 30 days with authorizing provider or within the authorizing provider's specialty.  See \"Patient Info\" tab in inbasket, or \"Choose Columns\" in Meds & Orders " section of the refill encounter.              Passed - Medication is active on med list        Passed - Patient is age 18 or older        Passed - Normal serum creatinine on file in past 12 months     Recent Labs   Lab Test 11/12/18  0800   CR 0.84

## 2019-07-25 ENCOUNTER — TRANSFERRED RECORDS (OUTPATIENT)
Dept: HEALTH INFORMATION MANAGEMENT | Facility: CLINIC | Age: 59
End: 2019-07-25

## 2019-08-08 ENCOUNTER — TRANSFERRED RECORDS (OUTPATIENT)
Dept: HEALTH INFORMATION MANAGEMENT | Facility: CLINIC | Age: 59
End: 2019-08-08

## 2019-08-09 ENCOUNTER — TELEPHONE (OUTPATIENT)
Dept: FAMILY MEDICINE | Facility: CLINIC | Age: 59
End: 2019-08-09

## 2019-08-09 DIAGNOSIS — I10 HYPERTENSION GOAL BP (BLOOD PRESSURE) < 140/90: ICD-10-CM

## 2019-08-09 RX ORDER — LISINOPRIL 40 MG/1
40 TABLET ORAL DAILY
Qty: 90 TABLET | Refills: 2 | Status: SHIPPED | OUTPATIENT
Start: 2019-08-09 | End: 2020-05-13

## 2019-08-09 NOTE — TELEPHONE ENCOUNTER
Reason for call:  Medication   If this is a refill request, has the caller requested the refill from the pharmacy already? Yes  Will the patient be using a [facility]? No  Name of the pharmacy and phone number for the current request:     Name of the medication requested: lisinopril    Other request:     Phone number to reach patient:  Home number on file [phone] (home)    Best Time:      Can we leave a detailed message on this number?  YES

## 2019-08-09 NOTE — TELEPHONE ENCOUNTER
Attempt # 1  Called patient at home [phone] (  Was call answered? No answer, left message a prescription was sent to patient's pharmacy if has questions may call [phone]          Lexi Jeter RN  [facility]

## 2019-09-05 ENCOUNTER — TELEPHONE (OUTPATIENT)
Dept: FAMILY MEDICINE | Facility: CLINIC | Age: 59
End: 2019-09-05

## 2019-09-05 DIAGNOSIS — M17.10 PRIMARY OSTEOARTHRITIS OF ONE KNEE: Primary | ICD-10-CM

## 2019-09-05 RX ORDER — IBUPROFEN 800 MG/1
800 TABLET, FILM COATED ORAL EVERY 8 HOURS PRN
Qty: 40 TABLET | Refills: 1 | Status: SHIPPED | OUTPATIENT
Start: 2019-09-05 | End: 2021-02-23

## 2019-09-05 NOTE — TELEPHONE ENCOUNTER
Called patient and he is requesting 800 mg ibuprofen prescription be sent in for a bad left knee that he stated he needs a replacement on. Patient stated PCP has not given him this medication but he is going out of town tomorrow and will be walking a lot and preferred to take one pill versus 4. Pharmacy cued.     Routed to provider.     Delmis Urias RN

## 2019-09-05 NOTE — TELEPHONE ENCOUNTER
Called patient at [phone]. Left message to return call to nurse triage line at [phone].    Delmis Urias RN

## 2019-09-05 NOTE — TELEPHONE ENCOUNTER
Reason for Call:  Other prescription Ibuprofen 100mg    Detailed comments: Pt would like to speak with nurse regarding if he is able to pick-up Rx today.    Phone Number Patient can be reached at: Home number on file [phone] (home)    Best Time: Anytime    Can we leave a detailed message on this number? NO    Call taken on 9/5/2019 at 9:53 AM by Madelaine Smith

## 2019-09-06 NOTE — TELEPHONE ENCOUNTER
Attempted to call patient at home/mobile number, left message on voicemail; patient was instructed to return call to [facility] RN directly on the RN call back line at [phone]   Sophie Landrum RN  [facility]

## 2019-09-09 NOTE — TELEPHONE ENCOUNTER
Called pharmacy patient has picked up prescription - no need to call and inform patient of prescription sent.      Lexi Jeter RN  [facility]

## 2019-10-01 ENCOUNTER — OFFICE VISIT (OUTPATIENT)
Dept: FAMILY MEDICINE | Facility: CLINIC | Age: 59
End: 2019-10-01
Payer: COMMERCIAL

## 2019-10-01 VITALS
HEIGHT: 65 IN | TEMPERATURE: 98.2 F | SYSTOLIC BLOOD PRESSURE: 130 MMHG | WEIGHT: 154.38 LBS | HEART RATE: 60 BPM | DIASTOLIC BLOOD PRESSURE: 78 MMHG | BODY MASS INDEX: 25.72 KG/M2

## 2019-10-01 DIAGNOSIS — M17.12 PRIMARY OSTEOARTHRITIS OF LEFT KNEE: ICD-10-CM

## 2019-10-01 DIAGNOSIS — Z01.818 PREOP GENERAL PHYSICAL EXAM: Primary | ICD-10-CM

## 2019-10-01 LAB
ALBUMIN SERPL-MCNC: 3.7 G/DL (ref 3.4–5)
ALP SERPL-CCNC: 56 U/L (ref 40–150)
ALT SERPL W P-5'-P-CCNC: 24 U/L (ref 0–70)
ANION GAP SERPL CALCULATED.3IONS-SCNC: 5 MMOL/L (ref 3–14)
AST SERPL W P-5'-P-CCNC: 15 U/L (ref 0–45)
BASOPHILS # BLD AUTO: 0 10E9/L (ref 0–0.2)
BASOPHILS NFR BLD AUTO: 1.1 %
BILIRUB SERPL-MCNC: 0.9 MG/DL (ref 0.2–1.3)
BUN SERPL-MCNC: 15 MG/DL (ref 7–30)
CALCIUM SERPL-MCNC: 9 MG/DL (ref 8.5–10.1)
CHLORIDE SERPL-SCNC: 104 MMOL/L (ref 94–109)
CO2 SERPL-SCNC: 28 MMOL/L (ref 20–32)
CREAT SERPL-MCNC: 0.83 MG/DL (ref 0.66–1.25)
DIFFERENTIAL METHOD BLD: ABNORMAL
EOSINOPHIL # BLD AUTO: 0.2 10E9/L (ref 0–0.7)
EOSINOPHIL NFR BLD AUTO: 4 %
ERYTHROCYTE [DISTWIDTH] IN BLOOD BY AUTOMATED COUNT: 11.9 % (ref 10–15)
GFR SERPL CREATININE-BSD FRML MDRD: >90 ML/MIN/{1.73_M2}
GLUCOSE SERPL-MCNC: 88 MG/DL (ref 70–99)
HCT VFR BLD AUTO: 40.9 % (ref 40–53)
HGB BLD-MCNC: 14 G/DL (ref 13.3–17.7)
LYMPHOCYTES # BLD AUTO: 1 10E9/L (ref 0.8–5.3)
LYMPHOCYTES NFR BLD AUTO: 27.2 %
MCH RBC QN AUTO: 32.9 PG (ref 26.5–33)
MCHC RBC AUTO-ENTMCNC: 34.2 G/DL (ref 31.5–36.5)
MCV RBC AUTO: 96 FL (ref 78–100)
MONOCYTES # BLD AUTO: 0.3 10E9/L (ref 0–1.3)
MONOCYTES NFR BLD AUTO: 9 %
NEUTROPHILS # BLD AUTO: 2.2 10E9/L (ref 1.6–8.3)
NEUTROPHILS NFR BLD AUTO: 58.7 %
PLATELET # BLD AUTO: 247 10E9/L (ref 150–450)
POTASSIUM SERPL-SCNC: 4.4 MMOL/L (ref 3.4–5.3)
PROT SERPL-MCNC: 6.8 G/DL (ref 6.8–8.8)
RBC # BLD AUTO: 4.26 10E12/L (ref 4.4–5.9)
SODIUM SERPL-SCNC: 137 MMOL/L (ref 133–144)
WBC # BLD AUTO: 3.8 10E9/L (ref 4–11)

## 2019-10-01 PROCEDURE — 85025 COMPLETE CBC W/AUTO DIFF WBC: CPT | Performed by: FAMILY MEDICINE

## 2019-10-01 PROCEDURE — 99215 OFFICE O/P EST HI 40 MIN: CPT | Performed by: FAMILY MEDICINE

## 2019-10-01 PROCEDURE — 80053 COMPREHEN METABOLIC PANEL: CPT | Performed by: FAMILY MEDICINE

## 2019-10-01 PROCEDURE — 36415 COLL VENOUS BLD VENIPUNCTURE: CPT | Performed by: FAMILY MEDICINE

## 2019-10-01 ASSESSMENT — PAIN SCALES - GENERAL: PAINLEVEL: NO PAIN (0)

## 2019-10-01 ASSESSMENT — MIFFLIN-ST. JEOR: SCORE: 1452.73

## 2019-10-01 NOTE — LETTER
[facility]   [address] [place]  [address]  [phone]                                   October 3, 2019    Jimbo Meyer  [address] [place] [address]        Dear Jimbo,    Your pre-op labs are okay.    Results for orders placed or performed in visit on 10/01/19   Comprehensive metabolic panel   Result Value Ref Range    Sodium 137 133 - 144 mmol/L    Potassium 4.4 3.4 - 5.3 mmol/L    Chloride 104 94 - 109 mmol/L    Carbon Dioxide 28 20 - 32 mmol/L    Anion Gap 5 3 - 14 mmol/L    Glucose 88 70 - 99 mg/dL    Urea Nitrogen 15 7 - 30 mg/dL    Creatinine 0.83 0.66 - 1.25 mg/dL    GFR Estimate >90 >60 mL/min/[1.73_m2]    GFR Estimate If Black >90 >60 mL/min/[1.73_m2]    Calcium 9.0 8.5 - 10.1 mg/dL    Bilirubin Total 0.9 0.2 - 1.3 mg/dL    Albumin 3.7 3.4 - 5.0 g/dL    Protein Total 6.8 6.8 - 8.8 g/dL    Alkaline Phosphatase 56 40 - 150 U/L    ALT 24 0 - 70 U/L    AST 15 0 - 45 U/L   CBC with platelets differential   Result Value Ref Range    WBC 3.8 (L) 4.0 - 11.0 10e9/L    RBC Count 4.26 (L) 4.4 - 5.9 10e12/L    Hemoglobin 14.0 13.3 - 17.7 g/dL    Hematocrit 40.9 40.0 - 53.0 %    MCV 96 78 - 100 fl    MCH 32.9 26.5 - 33.0 pg    MCHC 34.2 31.5 - 36.5 g/dL    RDW 11.9 10.0 - 15.0 %    Platelet Count 247 150 - 450 10e9/L    % Neutrophils 58.7 %    % Lymphocytes 27.2 %    % Monocytes 9.0 %    % Eosinophils 4.0 %    % Basophils 1.1 %    Absolute Neutrophil 2.2 1.6 - 8.3 10e9/L    Absolute Lymphocytes 1.0 0.8 - 5.3 10e9/L    Absolute Monocytes 0.3 0.0 - 1.3 10e9/L    Absolute Eosinophils 0.2 0.0 - 0.7 10e9/L    Absolute Basophils 0.0 0.0 - 0.2 10e9/L    Diff Method Automated Method        If you have any questions please call the clinic at [phone]    Sincerely,    Sourav Harding MD  [facility]

## 2019-10-01 NOTE — PATIENT INSTRUCTIONS
Before Your Surgery      Call your surgeon if there is any change in your health. This includes signs of a cold or flu (such as a sore throat, runny nose, cough, rash or fever).    Do not smoke, drink alcohol or take over the counter medicine (unless your surgeon or primary care doctor tells you to) for the 24 hours before and after surgery.    If you take prescribed drugs: Follow your doctor s orders about which medicines to take and which to stop until after surgery.    Eating and drinking prior to surgery: follow the instructions from your surgeon    Take a shower or bath the night before surgery. Use the soap your surgeon gave you to gently clean your skin. If you do not have soap from your surgeon, use your regular soap. Do not shave or scrub the surgery site.  Wear clean pajamas and have clean sheets on your bed.           Hold the aspirin and ibuprofen for one week prior to procedure    Okay to take tylenol ( acetaminophen ) as needed for pain    We will send you lab results    Take the amlodipine the am of procedure but hold the lisinopril that morning    Call with problems/ questions

## 2019-10-01 NOTE — PROGRESS NOTES
[address] [address] [phone]  Dept: [phone]    PRE-OP EVALUATION:  Today's date: 10/1/2019    Jimbo Meyer (: 1960) presents for pre-operative evaluation assessment as requested by Dr. Gayle.  He requires evaluation and anesthesia risk assessment prior to undergoing surgery/procedure for treatment of left knee .    Fax number for surgical facility: ? It will be at [facility] in [facility]  Primary Physician: Sourav Harding  Type of Anesthesia Anticipated: General    Patient has a Health Care Directive or Living Will:  NO    Preop Questions 10/1/2019   Who is doing your surgery? Nalini   What are you having done? 10 09 2019   Date of Surgery/Procedure:    Facility or Hospital where procedure/surgery will be performed: [facility]   1.  Do you have a history of Heart attack, stroke, stent, coronary bypass surgery, or other heart surgery? No   2.  Do you ever have any pain or discomfort in your chest? No   3.  Do you have a history of  Heart Failure? No   4.   Are you troubled by shortness of breath when:  walking on a level surface, or up a slight hill, or at night? No   5.  Do you currently have a cold, bronchitis or other respiratory infection? No   6.  Do you have a cough, shortness of breath, or wheezing? No   7.  Do you sometimes get pains in the calves of your legs when you walk? No   8. Do you or anyone in your family have previous history of blood clots? No   9.  Do you or does anyone in your family have a serious bleeding problem such as prolonged bleeding following surgeries or cuts? No   10. Have you ever had problems with anemia or been told to take iron pills? No   11. Have you had any abnormal blood loss such as black, tarry or bloody stools? No   12. Have you ever had a blood transfusion? No   13. Have you or any of your relatives ever had problems with anesthesia? No   14. Do you have sleep  apnea, excessive snoring or daytime drowsiness? No   15. Do you have any prosthetic heart valves? No   16. Do you have prosthetic joints? No         HPI:     HPI related to upcoming procedure: 59 year old male with left knee / osteoarthritis.  To have partial knee replacement.           MEDICAL HISTORY:     Patient Active Problem List    Diagnosis Date Noted     Advanced care planning/counseling discussion 02/28/2017     Priority: Medium     Hyperlipidemia LDL goal <100 02/28/2017     Priority: Medium     Erectile dysfunction, unspecified erectile dysfunction type 01/08/2016     Priority: Medium     Hypertension goal BP (blood pressure) < 140/90 11/23/2010     Priority: Medium      Past Medical History:   Diagnosis Date     HTN (hypertension)      Past Surgical History:   Procedure Laterality Date     COLONOSCOPY  6-20-10    Normal. Repeat in 10 yrs.   back surgery last year November to remove cyst      Current Outpatient Medications   Medication Sig Dispense Refill     amLODIPine (NORVASC) 5 MG tablet TAKE 1 TABLET BY MOUTH ONCE DAILY 90 tablet 0     aspirin 81 MG tablet Take 1 tablet by mouth daily. 30 tablet 3     calcium-vitamin D (CALTRATE) 600-400 MG-UNIT per tablet Take 1 tablet by mouth 2 times daily. 60 tablet 3     cyclobenzaprine (FLEXERIL) 5 MG tablet Take 1 tablet (5 mg) by mouth 3 times daily as needed for muscle spasms 24 tablet 0     fish oil-omega-3 fatty acids (FISH OIL) 1000 MG capsule take 2 g by mouth daily.       ibuprofen (ADVIL/MOTRIN) 800 MG tablet Take 1 tablet (800 mg) by mouth every 8 hours as needed for moderate pain 40 tablet 1     lisinopril (PRINIVIL/ZESTRIL) 40 MG tablet Take 1 tablet (40 mg) by mouth daily 90 tablet 2     MULTIVITAMIN TABS   OR 1 TABLET DAILY       tadalafil (CIALIS) 20 MG tablet TAKE 1 TABLET BY MOUTH ONCE DAILY AS NEEDED NEVER  USE  WITH  NITROGLYCERIN,  TERAZOSIN  OR  DOXAZOSIN 10 tablet 3   patient not currently taking the cyclobenzaprine      OTC products:  "None, except as noted above    Allergies   Allergen Reactions     Hctz Diarrhea      Latex Allergy: NO    Social History     Tobacco Use     Smoking status: Never Smoker     Smokeless tobacco: Never Used   Substance Use Topics     Alcohol use: Yes     Comment: occassionally drinks beer 2-4 a week     History   Drug Use No       REVIEW OF SYSTEMS:   Constitutional, neuro, ENT, endocrine, pulmonary, cardiac, gastrointestinal, genitourinary, musculoskeletal, integument and psychiatric systems are negative, except as otherwise noted.    No recent illnesses    Feeling good overall    EXAM:   /78 (BP Location: Left arm, Patient Position: Chair, Cuff Size: Adult Regular)   Pulse 60   Temp 98.2  F (36.8  C) (Oral)   Ht 1.66 m (5' 5.35\")   Wt 70 kg (154 lb 6 oz)   BMI 25.41 kg/m      GENERAL APPEARANCE: healthy, alert and no distress     EYES: EOMI,  PERRL     HENT: ear canals and TM's normal and nose and mouth without ulcers or lesions     NECK: no adenopathy, no asymmetry, masses, or scars and thyroid normal to palpation     RESP: lungs clear to auscultation - no rales, rhonchi or wheezes     CV: regular rates and rhythm, normal S1 S2, no S3 or S4 and no murmur, click or rub     ABDOMEN:  soft, nontender, no HSM or masses and bowel sounds normal     MS: extremities normal- no gross deformities noted, no evidence of inflammation in joints, FROM in all extremities.     SKIN: no suspicious lesions or rashes     NEURO: Normal strength and tone, sensory exam grossly normal, mentation intact and speech normal     PSYCH: mentation appears normal. and affect normal/bright     LYMPHATICS: No cervical adenopathy    DIAGNOSTICS:   See copy of echocardiogram done last fall, normal    Labs drawn, pending    Recent Labs   Lab Test 11/12/18  0800 05/10/18  0716 02/28/17  0709   HGB 14.7 14.4 14.6    254 268    136 135   POTASSIUM 4.4 4.1 4.1   CR 0.84 0.88 0.79   A1C  --  5.3 5.0        IMPRESSION:   Reason for " surgery/procedure: left knee pain/ osteoarthritis. To have partial knee replacement, possibly total.  Diagnosis/reason for consult: pre-op clearance     The proposed surgical procedure is considered INTERMEDIATE risk.    REVISED CARDIAC RISK INDEX  The patient has the following serious cardiovascular risks for perioperative complications such as (MI, PE, VFib and 3  AV Block):  No serious cardiac risks  INTERPRETATION: 0 risks: Class I (very low risk - 0.4% complication rate)    The patient has the following additional risks for perioperative complications:  No identified additional risks      ICD-10-CM    1. Preop general physical exam Z01.818        RECOMMENDATIONS:         --Patient is to take all scheduled medications on the day of surgery EXCEPT for modifications listed below.    Patient will hold the aspirin and ibuprofen for one week prior    Patient will take the amlodipine the am of procedure but hold the lisinopril that morning        APPROVAL GIVEN to proceed with proposed procedure, without further diagnostic evaluation, providing labs are okay    No history of bleeding or clotting problems    No cardiac problems and no suspicious symptoms    No history of anesthesia problems    Signed Electronically by: Sourav Harding MD    Copy of this evaluation report is provided to requesting physician.    [facility] Preop Guidelines    Revised Cardiac Risk Index

## 2019-10-02 DIAGNOSIS — I10 HYPERTENSION GOAL BP (BLOOD PRESSURE) < 140/90: ICD-10-CM

## 2019-10-02 RX ORDER — AMLODIPINE BESYLATE 5 MG/1
TABLET ORAL
Qty: 90 TABLET | Refills: 0 | Status: SHIPPED | OUTPATIENT
Start: 2019-10-02 | End: 2020-01-15

## 2019-10-02 NOTE — TELEPHONE ENCOUNTER
"Requested Prescriptions   Pending Prescriptions Disp Refills     amLODIPine (NORVASC) 5 MG tablet [Pharmacy Med Name: AMLODIPINE 5MG TAB] 90 tablet 0     Sig: TAKE 1 TABLET BY MOUTH ONCE DAILY   Last Written Prescription Date:  7/1/19  Last Fill Quantity: 90,  # refills: 0   Last office visit: 10/1/2019 with prescribing provider:     Future Office Visit:        Calcium Channel Blockers Protocol  Passed - 10/2/2019 11:07 AM        Passed - Blood pressure under 140/90 in past 12 months     BP Readings from Last 3 Encounters:   10/01/19 130/78   04/03/19 125/72   11/12/18 134/88                 Passed - Recent (12 mo) or future (30 days) visit within the authorizing provider's specialty     Patient has had an office visit with the authorizing provider or a provider within the authorizing providers department within the previous 12 mos or has a future within next 30 days. See \"Patient Info\" tab in inbasket, or \"Choose Columns\" in Meds & Orders section of the refill encounter.              Passed - Medication is active on med list        Passed - Patient is age 18 or older        Passed - Normal serum creatinine on file in past 12 months     Recent Labs   Lab Test 10/01/19  0713   CR 0.83               "

## 2019-10-02 NOTE — TELEPHONE ENCOUNTER
Prescription approved per [facility] Refill Protocol.  Rolando Maxwell RN

## 2019-10-03 NOTE — RESULT ENCOUNTER NOTE
Your pre-op labs are okay.    Sourav Harding MD    [facility] - please fax to [facility] in [place]  Thanks  Sourav Harding MD

## 2019-10-09 ENCOUNTER — TRANSFERRED RECORDS (OUTPATIENT)
Dept: HEALTH INFORMATION MANAGEMENT | Facility: CLINIC | Age: 59
End: 2019-10-09

## 2019-10-09 ENCOUNTER — RECORDS - HEALTHEAST (OUTPATIENT)
Dept: LAB | Facility: CLINIC | Age: 59
End: 2019-10-09

## 2019-10-09 LAB
APPEARANCE FLD: CLEAR
COLOR FLD: YELLOW
CRYSTALS SNV MICRO: ABNORMAL
GLUCOSE FLD-MCNC: 95 MG/DL
LYMPHOCYTES NFR FLD MANUAL: 24 %
MACROPHAGE % - HISTORICAL: 22 %
MONOCYTE % - HISTORICAL: 50 %
NEUTS BAND NFR FLD MANUAL: 3 %
OTHER CELLS FLD MANUAL: 2 %
PROT FLD-MCNC: 3.5 G/DL
RBC FLUID - HISTORICAL: ABNORMAL /UL
WBC # FLD AUTO: 243 /UL (ref 0–99)

## 2019-10-16 ENCOUNTER — TRANSFERRED RECORDS (OUTPATIENT)
Dept: HEALTH INFORMATION MANAGEMENT | Facility: CLINIC | Age: 59
End: 2019-10-16

## 2019-10-29 ENCOUNTER — TRANSFERRED RECORDS (OUTPATIENT)
Dept: HEALTH INFORMATION MANAGEMENT | Facility: CLINIC | Age: 59
End: 2019-10-29

## 2020-01-13 DIAGNOSIS — I10 HYPERTENSION GOAL BP (BLOOD PRESSURE) < 140/90: ICD-10-CM

## 2020-01-13 NOTE — TELEPHONE ENCOUNTER
"Requested Prescriptions   Pending Prescriptions Disp Refills     amLODIPine (NORVASC) 5 MG tablet [Pharmacy Med Name: amLODIPine Besylate 5 MG Oral Tablet]  0     Sig: TAKE 1 TABLET BY MOUTH ONCE DAILY   Last Written Prescription Date:  10-2-19  Last Fill Quantity: 90,  # refills: 0   Last office visit: 10/1/2019 with prescribing provider:  10-1-19   Future Office Visit:        Calcium Channel Blockers Protocol  Passed - 1/13/2020 12:25 PM        Passed - Blood pressure under 140/90 in past 12 months     BP Readings from Last 3 Encounters:   10/01/19 130/78   04/03/19 125/72   11/12/18 134/88                 Passed - Recent (12 mo) or future (30 days) visit within the authorizing provider's specialty     Patient has had an office visit with the authorizing provider or a provider within the authorizing providers department within the previous 12 mos or has a future within next 30 days. See \"Patient Info\" tab in inbasket, or \"Choose Columns\" in Meds & Orders section of the refill encounter.              Passed - Medication is active on med list        Passed - Patient is age 18 or older        Passed - Normal serum creatinine on file in past 12 months     Recent Labs   Lab Test 10/01/19  0713   CR 0.83               "

## 2020-01-15 RX ORDER — AMLODIPINE BESYLATE 5 MG/1
TABLET ORAL
Qty: 90 TABLET | Refills: 0 | Status: SHIPPED | OUTPATIENT
Start: 2020-01-15 | End: 2020-04-09

## 2020-01-15 NOTE — TELEPHONE ENCOUNTER
Prescription approved per [facility] Refill Protocol.    Melissa Mayfield, RN, BSN, PHN  [facility] [place]

## 2020-01-17 ENCOUNTER — TRANSFERRED RECORDS (OUTPATIENT)
Dept: HEALTH INFORMATION MANAGEMENT | Facility: CLINIC | Age: 60
End: 2020-01-17

## 2020-03-30 DIAGNOSIS — N52.9 ERECTILE DYSFUNCTION, UNSPECIFIED ERECTILE DYSFUNCTION TYPE: ICD-10-CM

## 2020-03-30 RX ORDER — TADALAFIL 20 MG/1
TABLET ORAL
Qty: 10 TABLET | Refills: 3 | Status: SHIPPED | OUTPATIENT
Start: 2020-03-30 | End: 2020-11-11

## 2020-03-30 NOTE — TELEPHONE ENCOUNTER
"Requested Prescriptions   Pending Prescriptions Disp Refills     tadalafil (CIALIS) 20 MG tablet 10 tablet 3   Last Written Prescription Date:  7/1/19  Last Fill Quantity: 10,  # refills: 3   Last office visit: 10/1/2019 with prescribing provider:     Future Office Visit:        Erectile Dysfuction Protocol Passed - 3/30/2020  1:54 PM        Passed - Absence of nitrates on medication list        Passed - Absence of Alpha Blockers on Med list        Passed - Recent (12 mo) or future (30 days) visit within the authorizing provider's specialty     Patient has had an office visit with the authorizing provider or a provider within the authorizing providers department within the previous 12 mos or has a future within next 30 days. See \"Patient Info\" tab in inbasket, or \"Choose Columns\" in Meds & Orders section of the refill encounter.              Passed - Medication is active on med list        Passed - Patient is age 18 or older             "

## 2020-03-30 NOTE — TELEPHONE ENCOUNTER
Reason for Call:  Medication or medication refill:    Do you use a [facility]?  Name of the pharmacy and phone number for the current request:  See attached     Name of the medication requested: tadalafil (CIALIS) 20 MG tablet     Other request: n/a    Can we leave a detailed message on this number? YES    Phone number patient can be reached at: Home number on file [phone] (home)    Best Time: Anytime    Call taken on 3/30/2020 at 10:53 AM by Mya Campa

## 2020-07-16 DIAGNOSIS — I10 HYPERTENSION GOAL BP (BLOOD PRESSURE) < 140/90: ICD-10-CM

## 2020-07-16 RX ORDER — AMLODIPINE BESYLATE 5 MG/1
TABLET ORAL
Qty: 90 TABLET | Refills: 0 | Status: SHIPPED | OUTPATIENT
Start: 2020-07-16 | End: 2020-10-20

## 2020-07-16 NOTE — TELEPHONE ENCOUNTER
Prescription approved per [facility] Refill Protocol.    MATT MijaresN, RN  [facility]

## 2020-08-10 DIAGNOSIS — I10 HYPERTENSION GOAL BP (BLOOD PRESSURE) < 140/90: ICD-10-CM

## 2020-08-11 RX ORDER — LISINOPRIL 40 MG/1
TABLET ORAL
Qty: 90 TABLET | Refills: 0 | Status: SHIPPED | OUTPATIENT
Start: 2020-08-11 | End: 2020-11-10

## 2020-08-11 NOTE — TELEPHONE ENCOUNTER
"Requested Prescriptions   Signed Prescriptions Disp Refills    lisinopril (ZESTRIL) 40 MG tablet 90 tablet 0     Sig: Take 1 tablet by mouth once daily       ACE Inhibitors (Including Combos) Protocol Passed - 8/10/2020  5:56 AM        Passed - Blood pressure under 140/90 in past 12 months     BP Readings from Last 3 Encounters:   10/01/19 130/78   04/03/19 125/72   11/12/18 134/88                 Passed - Recent (12 mo) or future (30 days) visit within the authorizing provider's specialty     Patient has had an office visit with the authorizing provider or a provider within the authorizing providers department within the previous 12 mos or has a future within next 30 days. See \"Patient Info\" tab in inbasket, or \"Choose Columns\" in Meds & Orders section of the refill encounter.              Passed - Medication is active on med list        Passed - Patient is age 18 or older        Passed - Normal serum creatinine on file in past 12 months     Recent Labs   Lab Test 10/01/19  0713   CR 0.83       Ok to refill medication if creatinine is low          Passed - Normal serum potassium on file in past 12 months     Recent Labs   Lab Test 10/01/19  0713   POTASSIUM 4.4                  Prescription approved per [facility] Refill Protocol.    Tasha Mancini RN    "

## 2020-10-19 DIAGNOSIS — I10 HYPERTENSION GOAL BP (BLOOD PRESSURE) < 140/90: ICD-10-CM

## 2020-10-20 RX ORDER — AMLODIPINE BESYLATE 5 MG/1
TABLET ORAL
Qty: 90 TABLET | Refills: 0 | Status: SHIPPED | OUTPATIENT
Start: 2020-10-20 | End: 2021-01-19

## 2020-11-09 DIAGNOSIS — I10 HYPERTENSION GOAL BP (BLOOD PRESSURE) < 140/90: ICD-10-CM

## 2020-11-10 DIAGNOSIS — N52.9 ERECTILE DYSFUNCTION, UNSPECIFIED ERECTILE DYSFUNCTION TYPE: ICD-10-CM

## 2020-11-10 RX ORDER — LISINOPRIL 40 MG/1
TABLET ORAL
Qty: 90 TABLET | Refills: 0 | Status: SHIPPED | OUTPATIENT
Start: 2020-11-10 | End: 2021-02-23

## 2020-11-11 RX ORDER — TADALAFIL 20 MG/1
TABLET ORAL
Qty: 10 TABLET | Refills: 0 | Status: SHIPPED | OUTPATIENT
Start: 2020-11-11 | End: 2020-12-22

## 2020-11-11 NOTE — TELEPHONE ENCOUNTER
One time fill has been sent in. Please reach out to patient for an appointment. He has not been seen in over a year.     Thank you.    Delmis Urias RN

## 2020-12-20 DIAGNOSIS — N52.9 ERECTILE DYSFUNCTION, UNSPECIFIED ERECTILE DYSFUNCTION TYPE: ICD-10-CM

## 2020-12-22 RX ORDER — TADALAFIL 20 MG/1
TABLET ORAL
Qty: 6 TABLET | Refills: 0 | Status: SHIPPED | OUTPATIENT
Start: 2020-12-22 | End: 2021-02-23

## 2020-12-23 NOTE — TELEPHONE ENCOUNTER
Medication is being filled for 1 time refill only due to:  Patient needs to be seen because it has been more than one year since last visit.   Please call to schedule annual exam.  Alaina Bernard RN

## 2021-01-18 DIAGNOSIS — I10 HYPERTENSION GOAL BP (BLOOD PRESSURE) < 140/90: ICD-10-CM

## 2021-01-18 NOTE — LETTER
January 25, 2021    Jimbo Meyer                                                                                                                     [address] [place] [address]            Dear Jimbo,    Your provider has sent a  liz refill of amlodipine. You are due for an appointment for further refills.  Please contact the clinic to schedule an appointment for further refills.               Sincerely,       Team Francesco Harding MD

## 2021-01-19 RX ORDER — AMLODIPINE BESYLATE 5 MG/1
TABLET ORAL
Qty: 30 TABLET | Refills: 0 | Status: SHIPPED | OUTPATIENT
Start: 2021-01-19 | End: 2021-02-23

## 2021-01-19 NOTE — TELEPHONE ENCOUNTER
Okay one month only     Needs to be seen in clinic at [facility]    Please inform patient    Sourav Harding MD

## 2021-01-19 NOTE — TELEPHONE ENCOUNTER
Pending Prescriptions:                       Disp   Refills    amLODIPine (NORVASC) 5 MG tablet [Pharmac*90 tab*0            Sig: TAKE 1 TABLET BY MOUTH ONCE DAILY . APPOINTMENT           REQUIRED FOR FUTURE REFILLS    Routing refill request to provider for review/approval because:  Remedios given x1 and patient did not follow up, please advise    Miriam Armstrong RN

## 2021-02-23 ENCOUNTER — OFFICE VISIT (OUTPATIENT)
Dept: FAMILY MEDICINE | Facility: CLINIC | Age: 61
End: 2021-02-23
Payer: COMMERCIAL

## 2021-02-23 VITALS
HEART RATE: 60 BPM | TEMPERATURE: 97.9 F | HEIGHT: 65 IN | BODY MASS INDEX: 26.74 KG/M2 | WEIGHT: 160.5 LBS | SYSTOLIC BLOOD PRESSURE: 133 MMHG | DIASTOLIC BLOOD PRESSURE: 86 MMHG

## 2021-02-23 DIAGNOSIS — R53.83 FATIGUE, UNSPECIFIED TYPE: ICD-10-CM

## 2021-02-23 DIAGNOSIS — I10 HYPERTENSION GOAL BP (BLOOD PRESSURE) < 140/90: ICD-10-CM

## 2021-02-23 DIAGNOSIS — N52.9 ERECTILE DYSFUNCTION, UNSPECIFIED ERECTILE DYSFUNCTION TYPE: ICD-10-CM

## 2021-02-23 DIAGNOSIS — Z13.1 SCREENING FOR DIABETES MELLITUS: ICD-10-CM

## 2021-02-23 DIAGNOSIS — Z12.5 SCREENING FOR PROSTATE CANCER: ICD-10-CM

## 2021-02-23 DIAGNOSIS — Z00.00 ROUTINE GENERAL MEDICAL EXAMINATION AT A HEALTH CARE FACILITY: Primary | ICD-10-CM

## 2021-02-23 DIAGNOSIS — E78.5 HYPERLIPIDEMIA LDL GOAL <100: ICD-10-CM

## 2021-02-23 LAB
ALBUMIN SERPL-MCNC: 3.9 G/DL (ref 3.4–5)
ALP SERPL-CCNC: 48 U/L (ref 40–150)
ALT SERPL W P-5'-P-CCNC: 25 U/L (ref 0–70)
ANION GAP SERPL CALCULATED.3IONS-SCNC: 1 MMOL/L (ref 3–14)
AST SERPL W P-5'-P-CCNC: 14 U/L (ref 0–45)
BASOPHILS # BLD AUTO: 0 10E9/L (ref 0–0.2)
BASOPHILS NFR BLD AUTO: 0.9 %
BILIRUB SERPL-MCNC: 1.6 MG/DL (ref 0.2–1.3)
BUN SERPL-MCNC: 11 MG/DL (ref 7–30)
CALCIUM SERPL-MCNC: 9.1 MG/DL (ref 8.5–10.1)
CHLORIDE SERPL-SCNC: 104 MMOL/L (ref 94–109)
CHOLEST SERPL-MCNC: 172 MG/DL
CO2 SERPL-SCNC: 30 MMOL/L (ref 20–32)
CREAT SERPL-MCNC: 0.86 MG/DL (ref 0.66–1.25)
DIFFERENTIAL METHOD BLD: ABNORMAL
EOSINOPHIL # BLD AUTO: 0.2 10E9/L (ref 0–0.7)
EOSINOPHIL NFR BLD AUTO: 4.6 %
ERYTHROCYTE [DISTWIDTH] IN BLOOD BY AUTOMATED COUNT: 11.8 % (ref 10–15)
GFR SERPL CREATININE-BSD FRML MDRD: >90 ML/MIN/{1.73_M2}
GLUCOSE SERPL-MCNC: 94 MG/DL (ref 70–99)
HBA1C MFR BLD: 5 % (ref 0–5.6)
HCT VFR BLD AUTO: 39 % (ref 40–53)
HDLC SERPL-MCNC: 68 MG/DL
HGB BLD-MCNC: 14.1 G/DL (ref 13.3–17.7)
LDLC SERPL CALC-MCNC: 78 MG/DL
LYMPHOCYTES # BLD AUTO: 1 10E9/L (ref 0.8–5.3)
LYMPHOCYTES NFR BLD AUTO: 23.3 %
MCH RBC QN AUTO: 32.8 PG (ref 26.5–33)
MCHC RBC AUTO-ENTMCNC: 36.2 G/DL (ref 31.5–36.5)
MCV RBC AUTO: 91 FL (ref 78–100)
MONOCYTES # BLD AUTO: 0.5 10E9/L (ref 0–1.3)
MONOCYTES NFR BLD AUTO: 10.6 %
NEUTROPHILS # BLD AUTO: 2.6 10E9/L (ref 1.6–8.3)
NEUTROPHILS NFR BLD AUTO: 60.6 %
NONHDLC SERPL-MCNC: 104 MG/DL
PLATELET # BLD AUTO: 232 10E9/L (ref 150–450)
POTASSIUM SERPL-SCNC: 4.5 MMOL/L (ref 3.4–5.3)
PROT SERPL-MCNC: 6.8 G/DL (ref 6.8–8.8)
PSA SERPL-ACNC: 0.6 UG/L (ref 0–4)
RBC # BLD AUTO: 4.3 10E12/L (ref 4.4–5.9)
SODIUM SERPL-SCNC: 135 MMOL/L (ref 133–144)
TRIGL SERPL-MCNC: 128 MG/DL
TSH SERPL DL<=0.005 MIU/L-ACNC: 1.55 MU/L (ref 0.4–4)
WBC # BLD AUTO: 4.3 10E9/L (ref 4–11)

## 2021-02-23 PROCEDURE — G0103 PSA SCREENING: HCPCS | Performed by: FAMILY MEDICINE

## 2021-02-23 PROCEDURE — 36415 COLL VENOUS BLD VENIPUNCTURE: CPT | Performed by: FAMILY MEDICINE

## 2021-02-23 PROCEDURE — 80050 GENERAL HEALTH PANEL: CPT | Performed by: FAMILY MEDICINE

## 2021-02-23 PROCEDURE — 83036 HEMOGLOBIN GLYCOSYLATED A1C: CPT | Performed by: FAMILY MEDICINE

## 2021-02-23 PROCEDURE — 99396 PREV VISIT EST AGE 40-64: CPT | Performed by: FAMILY MEDICINE

## 2021-02-23 PROCEDURE — 80061 LIPID PANEL: CPT | Performed by: FAMILY MEDICINE

## 2021-02-23 RX ORDER — TADALAFIL 20 MG/1
TABLET ORAL
Qty: 6 TABLET | Refills: 11 | Status: SHIPPED | OUTPATIENT
Start: 2021-02-23 | End: 2022-03-03

## 2021-02-23 RX ORDER — AMLODIPINE BESYLATE 5 MG/1
TABLET ORAL
Qty: 90 TABLET | Refills: 3 | Status: SHIPPED | OUTPATIENT
Start: 2021-02-23 | End: 2022-02-10

## 2021-02-23 RX ORDER — LISINOPRIL 40 MG/1
40 TABLET ORAL DAILY
Qty: 90 TABLET | Refills: 3 | Status: SHIPPED | OUTPATIENT
Start: 2021-02-23 | End: 2022-02-10

## 2021-02-23 ASSESSMENT — ENCOUNTER SYMPTOMS
HEMATOCHEZIA: 0
CONSTIPATION: 0
COUGH: 0
CHILLS: 0
ABDOMINAL PAIN: 0
DIARRHEA: 0
HEMATURIA: 0

## 2021-02-23 ASSESSMENT — MIFFLIN-ST. JEOR: SCORE: 1470.83

## 2021-02-23 ASSESSMENT — PAIN SCALES - GENERAL: PAINLEVEL: NO PAIN (0)

## 2021-02-23 NOTE — PATIENT INSTRUCTIONS
Keep working on healthy diet/exercise     We will send you lab results    Stay on same meds; could take 1 or both blood pressure meds at night if desired

## 2021-02-23 NOTE — LETTER
February 25, 2021      Rubenjose Meyer  [address] [place] [address]        Dear ,    We are writing to inform you of your test results.    The low anion gap is not worrisome     Total bilirubin is up a bit but okay to monitor ( other liver function tests are fine )     Other labs are good       Resulted Orders   Lipid panel reflex to direct LDL Fasting   Result Value Ref Range    Cholesterol 172 <200 mg/dL    Triglycerides 128 <150 mg/dL      Comment:      Fasting specimen    HDL Cholesterol 68 >39 mg/dL    LDL Cholesterol Calculated 78 <100 mg/dL      Comment:      Desirable:       <100 mg/dl    Non HDL Cholesterol 104 <130 mg/dL   Comprehensive metabolic panel   Result Value Ref Range    Sodium 135 133 - 144 mmol/L    Potassium 4.5 3.4 - 5.3 mmol/L    Chloride 104 94 - 109 mmol/L    Carbon Dioxide 30 20 - 32 mmol/L    Anion Gap 1 (L) 3 - 14 mmol/L    Glucose 94 70 - 99 mg/dL      Comment:      Fasting specimen    Urea Nitrogen 11 7 - 30 mg/dL    Creatinine 0.86 0.66 - 1.25 mg/dL    GFR Estimate >90 >60 mL/min/[1.73_m2]      Comment:      Non  GFR Calc  Starting 12/18/2018, serum creatinine based estimated GFR (eGFR) will be   calculated using the Chronic Kidney Disease Epidemiology Collaboration   (CKD-EPI) equation.      GFR Estimate If Black >90 >60 mL/min/[1.73_m2]      Comment:       GFR Calc  Starting 12/18/2018, serum creatinine based estimated GFR (eGFR) will be   calculated using the Chronic Kidney Disease Epidemiology Collaboration   (CKD-EPI) equation.      Calcium 9.1 8.5 - 10.1 mg/dL    Bilirubin Total 1.6 (H) 0.2 - 1.3 mg/dL    Albumin 3.9 3.4 - 5.0 g/dL    Protein Total 6.8 6.8 - 8.8 g/dL    Alkaline Phosphatase 48 40 - 150 U/L    ALT 25 0 - 70 U/L    AST 14 0 - 45 U/L   TSH with free T4 reflex   Result Value Ref Range    TSH 1.55 0.40 - 4.00 mU/L   CBC with platelets differential   Result Value Ref Range    WBC 4.3 4.0 - 11.0 10e9/L    RBC  Count 4.30 (L) 4.4 - 5.9 10e12/L    Hemoglobin 14.1 13.3 - 17.7 g/dL    Hematocrit 39.0 (L) 40.0 - 53.0 %    MCV 91 78 - 100 fl    MCH 32.8 26.5 - 33.0 pg    MCHC 36.2 31.5 - 36.5 g/dL    RDW 11.8 10.0 - 15.0 %    Platelet Count 232 150 - 450 10e9/L    % Neutrophils 60.6 %    % Lymphocytes 23.3 %    % Monocytes 10.6 %    % Eosinophils 4.6 %    % Basophils 0.9 %    Absolute Neutrophil 2.6 1.6 - 8.3 10e9/L    Absolute Lymphocytes 1.0 0.8 - 5.3 10e9/L    Absolute Monocytes 0.5 0.0 - 1.3 10e9/L    Absolute Eosinophils 0.2 0.0 - 0.7 10e9/L    Absolute Basophils 0.0 0.0 - 0.2 10e9/L    Diff Method Automated Method    Prostate spec antigen screen   Result Value Ref Range    PSA 0.60 0 - 4 ug/L      Comment:      Assay Method:  Chemiluminescence using Siemens Vista analyzer   Hemoglobin A1c   Result Value Ref Range    Hemoglobin A1C 5.0 0 - 5.6 %      Comment:      Normal <5.7% Prediabetes 5.7-6.4%  Diabetes 6.5% or higher - adopted from ADA   consensus guidelines.         If you have any questions or concerns, please call the clinic at the number listed above.       Sincerely,      Sourav Harding MD/riya

## 2021-02-23 NOTE — PROGRESS NOTES
SUBJECTIVE:   CC: Jimbo Meyer is an 60 year old male who presents for preventative health visit.        Patient has been advised of split billing requirements and indicates understanding: Yes  Healthy Habits:     Getting at least 3 servings of Calcium per day:  Yes    Bi-annual eye exam:  NO    Dental care twice a year:  Yes    Sleep apnea or symptoms of sleep apnea:  Daytime drowsiness and Excessive snoring    Diet:  Regular (no restrictions)    Frequency of exercise:  4-5 days/week    Duration of exercise:  45-60 minutes    Taking medications regularly:  Yes    Medication side effects:  None    PHQ-2 Total Score: 0    Additional concerns today:  No               Today's PHQ-2 Score:   PHQ-2 ( 1999 [employer]) 2/23/2021   Q1: Little interest or pleasure in doing things 0   Q2: Feeling down, depressed or hopeless 0   PHQ-2 Score 0   Q1: Little interest or pleasure in doing things Not at all   Q2: Feeling down, depressed or hopeless Not at all   PHQ-2 Score 0       Abuse: Current or Past(Physical, Sexual or Emotional)- No  Do you feel safe in your environment? Yes        Social History     Tobacco Use     Smoking status: Never Smoker     Smokeless tobacco: Never Used   Substance Use Topics     Alcohol use: Yes     Comment: occassionally drinks beer 2-4 a week     If you drink alcohol do you typically have >3 drinks per day or >7 drinks per week? Yes      Alcohol Use 2/23/2021   Prescreen: >3 drinks/day or >7 drinks/week? No   Prescreen: >3 drinks/day or >7 drinks/week? -   No flowsheet data found.    Last PSA:   PSA   Date Value Ref Range Status   05/10/2018 0.46 0 - 4 ug/L Final     Comment:     Assay Method:  Chemiluminescence using Siemens Vista analyzer       Reviewed orders with patient. Reviewed health maintenance and updated orders accordingly - Yes       Reviewed and updated as needed this visit by clinical staff  Tobacco  Allergies  Meds   Med Hx  Surg Hx  Fam Hx  Soc Hx        Reviewed and updated as  "needed this visit by Provider                    Review of Systems   Constitutional: Negative for chills.   HENT: Negative for congestion.    Respiratory: Negative for cough.    Cardiovascular: Negative for chest pain.   Gastrointestinal: Negative for abdominal pain, constipation, diarrhea and hematochezia.   Genitourinary: Negative for hematuria.      waiting for vaccine    Patient avoided covid    Lots of walking at work    Yoga, walking, rock climbing        OBJECTIVE:   BP (!) 156/102 (BP Location: Left arm, Patient Position: Chair, Cuff Size: Adult Regular)   Pulse 60   Temp 97.9  F (36.6  C) (Oral)   Ht 1.653 m (5' 5.06\")   Wt 72.8 kg (160 lb 8 oz)   BMI 26.66 kg/m      Physical Exam  GENERAL: healthy, alert and no distress  EYES: Eyes grossly normal to inspection, PERRL and conjunctivae and sclerae normal  HENT: ear canals and TM's normal, nose and mouth without ulcers or lesions  NECK: no adenopathy, no asymmetry, masses, or scars and thyroid normal to palpation  RESP: lungs clear to auscultation - no rales, rhonchi or wheezes  CV: regular rate and rhythm, normal S1 S2, no S3 or S4, no murmur, click or rub, no peripheral edema and peripheral pulses strong  ABDOMEN: soft, nontender, no hepatosplenomegaly, no masses and bowel sounds normal  MS: no gross musculoskeletal defects noted, no edema  SKIN: no suspicious lesions or rashes  NEURO: Normal strength and tone, mentation intact and speech normal  PSYCH: mentation appears normal, affect normal/bright    Diagnostic Test Results:  Labs reviewed in Epic    ASSESSMENT/PLAN:   Jimbo was seen today for physical and health maintenance.    Diagnoses and all orders for this visit:    Routine general medical examination at a health care facility    Hypertension goal BP (blood pressure) < 140/90  -     amLODIPine (NORVASC) 5 MG tablet; TAKE 1 TABLET BY MOUTH ONCE DAILY . APPOINTMENT REQUIRED FOR FUTURE REFILLS  -     lisinopril (ZESTRIL) 40 MG tablet; Take 1 " "tablet (40 mg) by mouth daily    Erectile dysfunction, unspecified erectile dysfunction type  -     tadalafil (CIALIS) 20 MG tablet; TAKE 1 TABLET BY MOUTH ONCE DAILY AS NEEDED NEVER  USE  WITH  NITROGLYCERIN    Hyperlipidemia LDL goal <100  -     Lipid panel reflex to direct LDL Fasting  -     Comprehensive metabolic panel    Screening for prostate cancer  -     Prostate spec antigen screen    Fatigue, unspecified type  -     TSH with free T4 reflex  -     CBC with platelets differential    Screening for diabetes mellitus  -     Hemoglobin A1c    Discussed several things with patient  Overall stable  Keep working on healthy diet/exercise  Check labs fasting  Refill meds; could take 1 or both blood pressure meds at night.  He has noticed blood pressure higher in am   No std concern  Up to date on colonoscopy     Patient has been advised of split billing requirements and indicates understanding: Yes  COUNSELING:   Reviewed preventive health counseling, as reflected in patient instructions       Regular exercise       Healthy diet/nutrition       Safe sex practices/STD prevention       Colon cancer screening       Prostate cancer screening    Estimated body mass index is 26.66 kg/m  as calculated from the following:    Height as of this encounter: 1.653 m (5' 5.06\").    Weight as of this encounter: 72.8 kg (160 lb 8 oz).     Weight management plan: Discussed healthy diet and exercise guidelines    He reports that he has never smoked. He has never used smokeless tobacco.      Counseling Resources:  ATP IV Guidelines  Pooled Cohorts Equation Calculator  FRAX Risk Assessment  ICSI Preventive Guidelines  Dietary Guidelines for Americans, 2010  USDA's MyPlate  ASA Prophylaxis  Lung CA Screening    Sourav Harding MD  [facility]  "

## 2021-02-24 NOTE — RESULT ENCOUNTER NOTE
The low anion gap is not worrisome    Total bilirubin is up a bit but okay to monitor ( other liver function tests are fine )    Other labs are good    Sourav Harding MD

## 2021-02-25 RX ORDER — AMLODIPINE BESYLATE 5 MG/1
TABLET ORAL
Qty: 90 TABLET | Refills: 0 | OUTPATIENT
Start: 2021-02-25

## 2021-02-25 RX ORDER — LISINOPRIL 40 MG/1
TABLET ORAL
Qty: 90 TABLET | Refills: 0 | OUTPATIENT
Start: 2021-02-25

## 2021-05-29 ENCOUNTER — RECORDS - HEALTHEAST (OUTPATIENT)
Dept: ADMINISTRATIVE | Facility: CLINIC | Age: 61
End: 2021-05-29

## 2021-10-07 ENCOUNTER — TRANSFERRED RECORDS (OUTPATIENT)
Dept: HEALTH INFORMATION MANAGEMENT | Facility: CLINIC | Age: 61
End: 2021-10-07

## 2021-10-26 ENCOUNTER — TRANSFERRED RECORDS (OUTPATIENT)
Dept: HEALTH INFORMATION MANAGEMENT | Facility: CLINIC | Age: 61
End: 2021-10-26
Payer: COMMERCIAL

## 2021-11-19 ENCOUNTER — OFFICE VISIT (OUTPATIENT)
Dept: FAMILY MEDICINE | Facility: CLINIC | Age: 61
End: 2021-11-19
Payer: COMMERCIAL

## 2021-11-19 VITALS
SYSTOLIC BLOOD PRESSURE: 136 MMHG | DIASTOLIC BLOOD PRESSURE: 89 MMHG | BODY MASS INDEX: 27.91 KG/M2 | HEART RATE: 61 BPM | WEIGHT: 168 LBS | TEMPERATURE: 98 F

## 2021-11-19 DIAGNOSIS — Z01.818 PREOP GENERAL PHYSICAL EXAM: Primary | ICD-10-CM

## 2021-11-19 DIAGNOSIS — M75.102 TEAR OF LEFT ROTATOR CUFF, UNSPECIFIED TEAR EXTENT, UNSPECIFIED WHETHER TRAUMATIC: ICD-10-CM

## 2021-11-19 LAB
BASOPHILS # BLD AUTO: 0 10E3/UL (ref 0–0.2)
BASOPHILS NFR BLD AUTO: 1 %
EOSINOPHIL # BLD AUTO: 0.1 10E3/UL (ref 0–0.7)
EOSINOPHIL NFR BLD AUTO: 2 %
ERYTHROCYTE [DISTWIDTH] IN BLOOD BY AUTOMATED COUNT: 11.5 % (ref 10–15)
HCT VFR BLD AUTO: 42.6 % (ref 40–53)
HGB BLD-MCNC: 14.8 G/DL (ref 13.3–17.7)
LYMPHOCYTES # BLD AUTO: 1.7 10E3/UL (ref 0.8–5.3)
LYMPHOCYTES NFR BLD AUTO: 34 %
MCH RBC QN AUTO: 33 PG (ref 26.5–33)
MCHC RBC AUTO-ENTMCNC: 34.7 G/DL (ref 31.5–36.5)
MCV RBC AUTO: 95 FL (ref 78–100)
MONOCYTES # BLD AUTO: 0.5 10E3/UL (ref 0–1.3)
MONOCYTES NFR BLD AUTO: 10 %
NEUTROPHILS # BLD AUTO: 2.6 10E3/UL (ref 1.6–8.3)
NEUTROPHILS NFR BLD AUTO: 53 %
PLATELET # BLD AUTO: 257 10E3/UL (ref 150–450)
RBC # BLD AUTO: 4.49 10E6/UL (ref 4.4–5.9)
WBC # BLD AUTO: 4.9 10E3/UL (ref 4–11)

## 2021-11-19 PROCEDURE — 36415 COLL VENOUS BLD VENIPUNCTURE: CPT | Performed by: FAMILY MEDICINE

## 2021-11-19 PROCEDURE — 99214 OFFICE O/P EST MOD 30 MIN: CPT | Performed by: FAMILY MEDICINE

## 2021-11-19 PROCEDURE — 82565 ASSAY OF CREATININE: CPT | Performed by: FAMILY MEDICINE

## 2021-11-19 PROCEDURE — 85025 COMPLETE CBC W/AUTO DIFF WBC: CPT | Performed by: FAMILY MEDICINE

## 2021-11-19 PROCEDURE — 84132 ASSAY OF SERUM POTASSIUM: CPT | Performed by: FAMILY MEDICINE

## 2021-11-19 NOTE — PROGRESS NOTES
[facility]  [address] [place] [address]  [facility] [address]  Phone: [phone]  Primary Provider: Sourav Hart  Pre-op Performing Provider: SOURAV HART       PREOPERATIVE EVALUATION:  Today's date: 11/19/2021    Jimbo Meyer is a 61 year old male who presents for a preoperative evaluation.    Surgical Information:  Surgery/Procedure: left rotator cuff repair  Surgery Location: [facility]  Surgeon: Leonardo  Surgery Date: 12/03/2021  Time of Surgery: am  Where patient plans to recover: At home with family  Fax number for surgical facility: [phone]    Type of Anesthesia Anticipated: General         Subjective     HPI related to upcoming procedure: 61 year old male with chronic left rotator cuff tear. To have procedure Dec 3 at  [facility]    Preop Questions 11/19/2021   1. Have you ever had a heart attack or stroke? No   2. Have you ever had surgery on your heart or blood vessels, such as a stent placement, a coronary artery bypass, or surgery on an artery in your head, neck, heart, or legs? No   3. Do you have chest pain with activity? No   4. Do you have a history of  heart failure? No   5. Do you currently have a cold, bronchitis or symptoms of other infection? No   6. Do you have a cough, shortness of breath, or wheezing? No   7. Do you or anyone in your family have previous history of blood clots? No   8. Do you or does anyone in your family have a serious bleeding problem such as prolonged bleeding following surgeries or cuts? No   9. Have you ever had problems with anemia or been told to take iron pills? No   10. Have you had any abnormal blood loss such as black, tarry or bloody stools? No   11. Have you ever had a blood transfusion? No   12. Are you willing to have a blood transfusion if it is medically needed before, during, or after your surgery? Yes   13. Have you or any of your relatives ever had problems with anesthesia? No   14. Do  you have sleep apnea, excessive snoring or daytime drowsiness? No   15. Do you have any artifical heart valves or other implanted medical devices like a pacemaker, defibrillator, or continuous glucose monitor? No   16. Do you have artificial joints? No   17. Are you allergic to latex? No       Health Care Directive:  Patient does not have a Health Care Directive or Living Will:     Preoperative Review of :  Not on any controlled substances           Review of Systems  Constitutional, neuro, ENT, endocrine, pulmonary, cardiac, gastrointestinal, genitourinary, musculoskeletal, integument and psychiatric systems are negative, except as otherwise noted.    No recent illnesses    No history of anesthesia problems    No bleeding / clotting problems    No chest pain/ breathing problems    History htn, on meds    Exercising recently     Patient Active Problem List    Diagnosis Date Noted     Advanced care planning/counseling discussion 02/28/2017     Priority: Medium     Hyperlipidemia LDL goal <100 02/28/2017     Priority: Medium     Erectile dysfunction, unspecified erectile dysfunction type 01/08/2016     Priority: Medium     Hypertension goal BP (blood pressure) < 140/90 11/23/2010     Priority: Medium      Past Medical History:   Diagnosis Date     HTN (hypertension)      Past Surgical History:   Procedure Laterality Date     BACK SURGERY      removed cyst Nov 2018     COLONOSCOPY  6-20-10    Normal. Repeat in 10 yrs.     Current Outpatient Medications   Medication Sig Dispense Refill     amLODIPine (NORVASC) 5 MG tablet TAKE 1 TABLET BY MOUTH ONCE DAILY . APPOINTMENT REQUIRED FOR FUTURE REFILLS 90 tablet 3     aspirin 81 MG tablet Take 1 tablet by mouth daily. 30 tablet 3     calcium-vitamin D (CALTRATE) 600-400 MG-UNIT per tablet Take 1 tablet by mouth 2 times daily. 60 tablet 3     fish oil-omega-3 fatty acids (FISH OIL) 1000 MG capsule take 2 g by mouth daily.       lisinopril (ZESTRIL) 40 MG tablet Take 1  tablet (40 mg) by mouth daily 90 tablet 3     MULTIVITAMIN TABS   OR 1 TABLET DAILY       tadalafil (CIALIS) 20 MG tablet TAKE 1 TABLET BY MOUTH ONCE DAILY AS NEEDED NEVER  USE  WITH  NITROGLYCERIN 6 tablet 11       Allergies   Allergen Reactions     Hctz Diarrhea        Social History     Tobacco Use     Smoking status: Never Smoker     Smokeless tobacco: Never Used   Substance Use Topics     Alcohol use: Yes     Comment: occassionally drinks beer 2-4 a week        History   Drug Use No         Objective     /89 (BP Location: Right arm, Patient Position: Chair, Cuff Size: Adult Regular)   Pulse 61   Temp 98  F (36.7  C) (Temporal)   Wt 76.2 kg (168 lb)   BMI 27.91 kg/m      Physical Exam    GENERAL APPEARANCE: healthy, alert and no distress     EYES: EOMI,  PERRL     HENT: ear canals and TM's normal and nose and mouth without ulcers or lesions     NECK: no adenopathy, no asymmetry, masses, or scars and thyroid normal to palpation     RESP: lungs clear to auscultation - no rales, rhonchi or wheezes     CV: regular rates and rhythm, normal S1 S2, no S3 or S4 and no murmur, click or rub     ABDOMEN:  soft, nontender, no HSM or masses and bowel sounds normal     MS: extremities normal- no gross deformities noted, no evidence of inflammation in joints, FROM in all extremities.     SKIN: no suspicious lesions or rashes     NEURO: Normal strength and tone, sensory exam grossly normal, mentation intact and speech normal     PSYCH: mentation appears normal. and affect normal/bright     LYMPHATICS: No cervical adenopathy    Recent Labs   Lab Test 02/23/21  0843   HGB 14.1         POTASSIUM 4.5   CR 0.86   A1C 5.0        Diagnostics:        Labs drawn, pending ( cbc and potassium and creat )      No ekg needed       ASSESSMENT / PLAN:  (Z01.818) Preop general physical exam  (primary encounter diagnosis)  Comment: patient in good general health.    Plan: Potassium, Creatinine, CBC with Platelets &          Differential        If labs okay, then okay for procedure    (M75.102) Tear of left rotator cuff, unspecified tear extent, unspecified whether traumatic  Comment: as above   Plan: as above    Patient will hold aspirin and fish oil for one week prior to procedure    Take amlodipine am of procedure but hold lisinopril    I reviewed the patient's medications, allergies, medical history, family history, and social history.    Sourav Harding MD              Revised Cardiac Risk Index (RCRI):  The patient has the following serious cardiovascular risks for perioperative complications:   - No serious cardiac risks = 0 points     RCRI Interpretation: 0 points: Class I (very low risk - 0.4% complication rate)           Signed Electronically by: Sourav Harding MD  Copy of this evaluation report is provided to requesting physician.

## 2021-11-19 NOTE — PATIENT INSTRUCTIONS
Hold aspirin, fish oil, and ibuprofen/ naproxen type meds for one week prior    Hold lisinopril am of procedure    Do take amlodipine the am of procedure

## 2021-11-19 NOTE — LETTER
November 29, 2021      Jimbo Meyer  [address] [place] [address]        Dear ,    We are writing to inform you of your test results.    Two more results.  Potassium and kidney function are normal        Resulted Orders   CBC with platelets and differential   Result Value Ref Range    WBC Count 4.9 4.0 - 11.0 10e3/uL    RBC Count 4.49 4.40 - 5.90 10e6/uL    Hemoglobin 14.8 13.3 - 17.7 g/dL    Hematocrit 42.6 40.0 - 53.0 %    MCV 95 78 - 100 fL    MCH 33.0 26.5 - 33.0 pg    MCHC 34.7 31.5 - 36.5 g/dL    RDW 11.5 10.0 - 15.0 %    Platelet Count 257 150 - 450 10e3/uL    % Neutrophils 53 %    % Lymphocytes 34 %    % Monocytes 10 %    % Eosinophils 2 %    % Basophils 1 %    Absolute Neutrophils 2.6 1.6 - 8.3 10e3/uL    Absolute Lymphocytes 1.7 0.8 - 5.3 10e3/uL    Absolute Monocytes 0.5 0.0 - 1.3 10e3/uL    Absolute Eosinophils 0.1 0.0 - 0.7 10e3/uL    Absolute Basophils 0.0 0.0 - 0.2 10e3/uL   Potassium   Result Value Ref Range    Potassium 4.2 3.4 - 5.3 mmol/L   Creatinine   Result Value Ref Range    Creatinine 0.82 0.66 - 1.25 mg/dL    GFR Estimate >90 >60 mL/min/1.73m2      Comment:      As of July 11, 2021, eGFR is calculated by the CKD-EPI creatinine equation, without race adjustment. eGFR can be influenced by muscle mass, exercise, and diet. The reported eGFR is an estimation only and is only applicable if the renal function is stable.       If you have any questions or concerns, please call the clinic at the number listed above.       Sincerely,      Sourav Harding MD/riya

## 2021-11-19 NOTE — LETTER
November 23, 2021      Jimbo Meyer  [address] [place] [address]        Dear ,    We are writing to inform you of your test results.    The red and white blood cell counts are fine.  Okay for procedure        Resulted Orders   CBC with platelets and differential   Result Value Ref Range    WBC Count 4.9 4.0 - 11.0 10e3/uL    RBC Count 4.49 4.40 - 5.90 10e6/uL    Hemoglobin 14.8 13.3 - 17.7 g/dL    Hematocrit 42.6 40.0 - 53.0 %    MCV 95 78 - 100 fL    MCH 33.0 26.5 - 33.0 pg    MCHC 34.7 31.5 - 36.5 g/dL    RDW 11.5 10.0 - 15.0 %    Platelet Count 257 150 - 450 10e3/uL    % Neutrophils 53 %    % Lymphocytes 34 %    % Monocytes 10 %    % Eosinophils 2 %    % Basophils 1 %    Absolute Neutrophils 2.6 1.6 - 8.3 10e3/uL    Absolute Lymphocytes 1.7 0.8 - 5.3 10e3/uL    Absolute Monocytes 0.5 0.0 - 1.3 10e3/uL    Absolute Eosinophils 0.1 0.0 - 0.7 10e3/uL    Absolute Basophils 0.0 0.0 - 0.2 10e3/uL       If you have any questions or concerns, please call the clinic at the number listed above.       Sincerely,      Sourav Harding MD/ritter

## 2021-11-24 LAB
CREAT SERPL-MCNC: 0.82 MG/DL (ref 0.66–1.25)
GFR SERPL CREATININE-BSD FRML MDRD: >90 ML/MIN/1.73M2
POTASSIUM BLD-SCNC: 4.2 MMOL/L (ref 3.4–5.3)

## 2021-12-03 ENCOUNTER — TRANSFERRED RECORDS (OUTPATIENT)
Dept: HEALTH INFORMATION MANAGEMENT | Facility: CLINIC | Age: 61
End: 2021-12-03
Payer: COMMERCIAL

## 2021-12-16 ENCOUNTER — TRANSFERRED RECORDS (OUTPATIENT)
Dept: HEALTH INFORMATION MANAGEMENT | Facility: CLINIC | Age: 61
End: 2021-12-16
Payer: COMMERCIAL

## 2022-01-13 ENCOUNTER — TRANSFERRED RECORDS (OUTPATIENT)
Dept: HEALTH INFORMATION MANAGEMENT | Facility: CLINIC | Age: 62
End: 2022-01-13
Payer: COMMERCIAL

## 2022-02-08 DIAGNOSIS — I10 HYPERTENSION GOAL BP (BLOOD PRESSURE) < 140/90: ICD-10-CM

## 2022-02-10 RX ORDER — LISINOPRIL 40 MG/1
TABLET ORAL
Qty: 90 TABLET | Refills: 2 | Status: SHIPPED | OUTPATIENT
Start: 2022-02-10 | End: 2022-12-05

## 2022-02-10 RX ORDER — AMLODIPINE BESYLATE 5 MG/1
TABLET ORAL
Qty: 90 TABLET | Refills: 2 | Status: SHIPPED | OUTPATIENT
Start: 2022-02-10 | End: 2022-12-05

## 2022-02-10 NOTE — TELEPHONE ENCOUNTER
"Prescription approved per [facility] Refill Protocol.    Requested Prescriptions   Pending Prescriptions Disp Refills     amLODIPine (NORVASC) 5 MG tablet [Pharmacy Med Name: amLODIPine Besylate 5 MG Oral Tablet] 90 tablet 0     Sig: TAKE 1 TABLET BY MOUTH ONCE DAILY . APPOINTMENT REQUIRED FOR FUTURE REFILLS       Calcium Channel Blockers Protocol  Passed - 2/8/2022  4:03 PM        Passed - Blood pressure under 140/90 in past 12 months     BP Readings from Last 3 Encounters:   11/19/21 136/89   02/23/21 133/86   10/01/19 130/78                 Passed - Recent (12 mo) or future (30 days) visit within the authorizing provider's specialty     Patient has had an office visit with the authorizing provider or a provider within the authorizing providers department within the previous 12 mos or has a future within next 30 days. See \"Patient Info\" tab in inbasket, or \"Choose Columns\" in Meds & Orders section of the refill encounter.              Passed - Medication is active on med list        Passed - Patient is age 18 or older        Passed - Normal serum creatinine on file in past 12 months     Recent Labs   Lab Test 11/19/21  1603   CR 0.82       Ok to refill medication if creatinine is low             lisinopril (ZESTRIL) 40 MG tablet [Pharmacy Med Name: Lisinopril 40 MG Oral Tablet] 90 tablet 0     Sig: Take 1 tablet by mouth once daily       ACE Inhibitors (Including Combos) Protocol Passed - 2/8/2022  4:03 PM        Passed - Blood pressure under 140/90 in past 12 months     BP Readings from Last 3 Encounters:   11/19/21 136/89   02/23/21 133/86   10/01/19 130/78                 Passed - Recent (12 mo) or future (30 days) visit within the authorizing provider's specialty     Patient has had an office visit with the authorizing provider or a provider within the authorizing providers department within the previous 12 mos or has a future within next 30 days. See \"Patient Info\" tab in inbasket, or \"Choose Columns\" in Meds & " Orders section of the refill encounter.              Passed - Medication is active on med list        Passed - Patient is age 18 or older        Passed - Normal serum creatinine on file in past 12 months     Recent Labs   Lab Test 11/19/21  1603   CR 0.82       Ok to refill medication if creatinine is low          Passed - Normal serum potassium on file in past 12 months     Recent Labs   Lab Test 11/19/21  1603   POTASSIUM 4.2                Tahira Aviles MSN, BSN, RN on 2/10/2022 at 11:29 AM  [facility]

## 2022-02-24 ENCOUNTER — TRANSFERRED RECORDS (OUTPATIENT)
Dept: HEALTH INFORMATION MANAGEMENT | Facility: CLINIC | Age: 62
End: 2022-02-24
Payer: COMMERCIAL

## 2022-04-21 ENCOUNTER — TRANSFERRED RECORDS (OUTPATIENT)
Dept: HEALTH INFORMATION MANAGEMENT | Facility: CLINIC | Age: 62
End: 2022-04-21
Payer: COMMERCIAL

## 2022-05-02 DIAGNOSIS — N52.9 ERECTILE DYSFUNCTION, UNSPECIFIED ERECTILE DYSFUNCTION TYPE: ICD-10-CM

## 2022-05-05 RX ORDER — TADALAFIL 20 MG/1
TABLET ORAL
Qty: 6 TABLET | Refills: 5 | Status: SHIPPED | OUTPATIENT
Start: 2022-05-05 | End: 2023-01-17

## 2022-05-05 NOTE — TELEPHONE ENCOUNTER
Prescription approved per [facility] Refill Protocol.    Laurel Azul RN

## 2022-08-05 ENCOUNTER — VIRTUAL VISIT (OUTPATIENT)
Dept: FAMILY MEDICINE | Facility: CLINIC | Age: 62
End: 2022-08-05
Payer: COMMERCIAL

## 2022-08-05 DIAGNOSIS — Z12.11 SCREEN FOR COLON CANCER: Primary | ICD-10-CM

## 2022-08-05 DIAGNOSIS — B35.6 TINEA CRURIS: ICD-10-CM

## 2022-08-05 PROCEDURE — 99213 OFFICE O/P EST LOW 20 MIN: CPT | Mod: 95 | Performed by: INTERNAL MEDICINE

## 2022-08-05 RX ORDER — KETOCONAZOLE 20 MG/G
CREAM TOPICAL DAILY
Qty: 60 G | Refills: 4 | Status: SHIPPED | OUTPATIENT
Start: 2022-08-05 | End: 2023-01-23

## 2022-08-05 RX ORDER — FLUCONAZOLE 150 MG/1
150 TABLET ORAL ONCE
Qty: 1 TABLET | Refills: 0 | Status: SHIPPED | OUTPATIENT
Start: 2022-08-05 | End: 2022-08-05

## 2022-08-05 NOTE — PROGRESS NOTES
Fletcher is a 62 year old who is being evaluated via a billable telephone visit.      What phone number would you like to be contacted at? [phone]  How would you like to obtain your AVS? Mail a copy    Assessment & Plan   Problem List Items Addressed This Visit    None     Visit Diagnoses     Screen for colon cancer    -  Primary    Tinea cruris        Relevant Medications    fluconazole (DIFLUCAN) 150 MG tablet    ketoconazole (NIZORAL) 2 % external cream                  Work on weight loss  Regular exercise    No follow-ups on file.    Pepe Subramanian MD  [facility]    Subjective   Fletcher is a 62 year old, presenting for the following health issues:  Derm Problem      HPI     Rash  Onset/Duration: 3 weeks  Description  Location: Groin area  Character: round, blotchy, raised  Itching: severe  Intensity:  moderate  Progression of Symptoms:  constant  Accompanying signs and symptoms:   Fever: No  Body aches or joint pain: No  Sore throat symptoms: No  Recent cold symptoms: No  History:           Previous episodes of similar rash: None  New exposures:  None  Recent travel: No  Exposure to similar rash: No  Precipitating or alleviating factors: Nothing  Therapies tried and outcome: hydrocortisone cream -  not effective    First time ever 3 weeks ago - started treating with lamisil   After - hiking in [place]   lamisil and hydrocortisone   No change in sexual practice and partner             Review of Systems   Constitutional, HEENT, cardiovascular, pulmonary, gi and gu systems are negative, except as otherwise noted.      Objective           Vitals:  No vitals were obtained today due to virtual visit.    Physical Exam   healthy, alert and no distress  PSYCH: Alert and oriented times 3; coherent speech, normal   rate and volume, able to articulate logical thoughts, able   to abstract reason, no tangential thoughts, no hallucinations   or delusions  His affect is normal  RESP: No  cough, no audible wheezing, able to talk in full sentences  Remainder of exam unable to be completed due to telephone visits    No results found for any visits on 08/05/22.            Phone call duration: 14 minutes    .  ..

## 2022-12-13 ENCOUNTER — OFFICE VISIT (OUTPATIENT)
Dept: FAMILY MEDICINE | Facility: CLINIC | Age: 62
End: 2022-12-13
Payer: COMMERCIAL

## 2022-12-13 VITALS
HEIGHT: 65 IN | WEIGHT: 173.13 LBS | TEMPERATURE: 97.1 F | HEART RATE: 60 BPM | SYSTOLIC BLOOD PRESSURE: 136 MMHG | OXYGEN SATURATION: 98 % | DIASTOLIC BLOOD PRESSURE: 88 MMHG | BODY MASS INDEX: 28.84 KG/M2

## 2022-12-13 DIAGNOSIS — E55.9 VITAMIN D DEFICIENCY DISEASE: ICD-10-CM

## 2022-12-13 DIAGNOSIS — I10 HYPERTENSION GOAL BP (BLOOD PRESSURE) < 140/90: ICD-10-CM

## 2022-12-13 DIAGNOSIS — R06.83 SNORING: ICD-10-CM

## 2022-12-13 DIAGNOSIS — R53.83 FATIGUE, UNSPECIFIED TYPE: ICD-10-CM

## 2022-12-13 DIAGNOSIS — Z12.11 SCREEN FOR COLON CANCER: ICD-10-CM

## 2022-12-13 DIAGNOSIS — Z12.5 SCREENING FOR PROSTATE CANCER: ICD-10-CM

## 2022-12-13 DIAGNOSIS — Z13.1 SCREENING FOR DIABETES MELLITUS: ICD-10-CM

## 2022-12-13 DIAGNOSIS — Z00.00 ROUTINE GENERAL MEDICAL EXAMINATION AT A HEALTH CARE FACILITY: Primary | ICD-10-CM

## 2022-12-13 DIAGNOSIS — E78.5 HYPERLIPIDEMIA LDL GOAL <100: ICD-10-CM

## 2022-12-13 LAB
ALBUMIN SERPL-MCNC: 3.9 G/DL (ref 3.4–5)
ALP SERPL-CCNC: 64 U/L (ref 40–150)
ALT SERPL W P-5'-P-CCNC: 29 U/L (ref 0–70)
ANION GAP SERPL CALCULATED.3IONS-SCNC: 5 MMOL/L (ref 3–14)
AST SERPL W P-5'-P-CCNC: 19 U/L (ref 0–45)
BASOPHILS # BLD AUTO: 0.1 10E3/UL (ref 0–0.2)
BASOPHILS NFR BLD AUTO: 1 %
BILIRUB SERPL-MCNC: 1.1 MG/DL (ref 0.2–1.3)
BUN SERPL-MCNC: 10 MG/DL (ref 7–30)
CALCIUM SERPL-MCNC: 8.9 MG/DL (ref 8.5–10.1)
CHLORIDE BLD-SCNC: 103 MMOL/L (ref 94–109)
CHOLEST SERPL-MCNC: 177 MG/DL
CO2 SERPL-SCNC: 27 MMOL/L (ref 20–32)
CREAT SERPL-MCNC: 0.84 MG/DL (ref 0.66–1.25)
DEPRECATED CALCIDIOL+CALCIFEROL SERPL-MC: 54 UG/L (ref 20–75)
EOSINOPHIL # BLD AUTO: 0.2 10E3/UL (ref 0–0.7)
EOSINOPHIL NFR BLD AUTO: 4 %
ERYTHROCYTE [DISTWIDTH] IN BLOOD BY AUTOMATED COUNT: 12 % (ref 10–15)
FASTING STATUS PATIENT QL REPORTED: NORMAL
GFR SERPL CREATININE-BSD FRML MDRD: >90 ML/MIN/1.73M2
GLUCOSE BLD-MCNC: 101 MG/DL (ref 70–99)
HBA1C MFR BLD: 5.2 % (ref 0–5.6)
HCT VFR BLD AUTO: 44.7 % (ref 40–53)
HDLC SERPL-MCNC: 58 MG/DL
HGB BLD-MCNC: 15.3 G/DL (ref 13.3–17.7)
LDLC SERPL CALC-MCNC: 99 MG/DL
LYMPHOCYTES # BLD AUTO: 1.1 10E3/UL (ref 0.8–5.3)
LYMPHOCYTES NFR BLD AUTO: 24 %
MCH RBC QN AUTO: 32.3 PG (ref 26.5–33)
MCHC RBC AUTO-ENTMCNC: 34.2 G/DL (ref 31.5–36.5)
MCV RBC AUTO: 95 FL (ref 78–100)
MONOCYTES # BLD AUTO: 0.4 10E3/UL (ref 0–1.3)
MONOCYTES NFR BLD AUTO: 9 %
NEUTROPHILS # BLD AUTO: 2.9 10E3/UL (ref 1.6–8.3)
NEUTROPHILS NFR BLD AUTO: 62 %
NONHDLC SERPL-MCNC: 119 MG/DL
PLATELET # BLD AUTO: 268 10E3/UL (ref 150–450)
POTASSIUM BLD-SCNC: 4.4 MMOL/L (ref 3.4–5.3)
PROT SERPL-MCNC: 7.5 G/DL (ref 6.8–8.8)
PSA SERPL-MCNC: 0.5 UG/L (ref 0–4)
RBC # BLD AUTO: 4.73 10E6/UL (ref 4.4–5.9)
SODIUM SERPL-SCNC: 135 MMOL/L (ref 133–144)
TRIGL SERPL-MCNC: 101 MG/DL
TSH SERPL DL<=0.005 MIU/L-ACNC: 1.73 MU/L (ref 0.4–4)
WBC # BLD AUTO: 4.6 10E3/UL (ref 4–11)

## 2022-12-13 PROCEDURE — 99213 OFFICE O/P EST LOW 20 MIN: CPT | Mod: 25 | Performed by: FAMILY MEDICINE

## 2022-12-13 PROCEDURE — 82306 VITAMIN D 25 HYDROXY: CPT | Performed by: FAMILY MEDICINE

## 2022-12-13 PROCEDURE — G0103 PSA SCREENING: HCPCS | Performed by: FAMILY MEDICINE

## 2022-12-13 PROCEDURE — 80061 LIPID PANEL: CPT | Performed by: FAMILY MEDICINE

## 2022-12-13 PROCEDURE — 83036 HEMOGLOBIN GLYCOSYLATED A1C: CPT | Performed by: FAMILY MEDICINE

## 2022-12-13 PROCEDURE — 36415 COLL VENOUS BLD VENIPUNCTURE: CPT | Performed by: FAMILY MEDICINE

## 2022-12-13 PROCEDURE — 80050 GENERAL HEALTH PANEL: CPT | Performed by: FAMILY MEDICINE

## 2022-12-13 PROCEDURE — 99396 PREV VISIT EST AGE 40-64: CPT | Performed by: FAMILY MEDICINE

## 2022-12-13 RX ORDER — AMLODIPINE BESYLATE 10 MG/1
10 TABLET ORAL DAILY
Qty: 90 TABLET | Refills: 3 | Status: SHIPPED | OUTPATIENT
Start: 2022-12-13 | End: 2024-01-08

## 2022-12-13 RX ORDER — LISINOPRIL 40 MG/1
40 TABLET ORAL DAILY
Qty: 90 TABLET | Refills: 3 | Status: SHIPPED | OUTPATIENT
Start: 2022-12-13 | End: 2024-01-16

## 2022-12-13 ASSESSMENT — ENCOUNTER SYMPTOMS
HEMATURIA: 0
WEAKNESS: 0
JOINT SWELLING: 0
DYSURIA: 0
ABDOMINAL PAIN: 0
HEMATOCHEZIA: 0
CONSTIPATION: 0
FREQUENCY: 1
DIARRHEA: 0
PARESTHESIAS: 0
HEADACHES: 0
ARTHRALGIAS: 0
COUGH: 0
DIZZINESS: 0
NAUSEA: 0
SORE THROAT: 0
CHILLS: 0
FEVER: 0
NERVOUS/ANXIOUS: 0
MYALGIAS: 0
HEARTBURN: 0
SHORTNESS OF BREATH: 0
EYE PAIN: 0
PALPITATIONS: 0

## 2022-12-13 ASSESSMENT — PAIN SCALES - GENERAL: PAINLEVEL: NO PAIN (0)

## 2022-12-13 NOTE — PATIENT INSTRUCTIONS
Increase amlodipine to 10 mg daily    Keep lisinopril as is    We will send you lab results    Do colonoscopy as planned    Schedule consult with sleep specialist               Preventive Health Recommendations  Male Ages 50 - 64    Yearly exam:             See your health care provider every year in order to  o   Review health changes.   o   Discuss preventive care.    o   Review your medicines if your doctor has prescribed any.   Have a cholesterol test every 5 years, or more frequently if you are at risk for high cholesterol/heart disease.   Have a diabetes test (fasting glucose) every three years. If you are at risk for diabetes, you should have this test more often.   Have a colonoscopy at age 50, or have a yearly FIT test (stool test). These exams will check for colon cancer.    Talk with your health care provider about whether or not a prostate cancer screening test (PSA) is right for you.  You should be tested each year for STDs (sexually transmitted diseases), if you re at risk.     Shots: Get a flu shot each year. Get a tetanus shot every 10 years.     Nutrition:  Eat at least 5 servings of fruits and vegetables daily.   Eat whole-grain bread, whole-wheat pasta and brown rice instead of white grains and rice.   Get adequate Calcium and Vitamin D.     Lifestyle  Exercise for at least 150 minutes a week (30 minutes a day, 5 days a week). This will help you control your weight and prevent disease.   Limit alcohol to one drink per day.   No smoking.   Wear sunscreen to prevent skin cancer.   See your dentist every six months for an exam and cleaning.   See your eye doctor every 1 to 2 years.

## 2022-12-13 NOTE — LETTER
December 15, 2022    Fletcher Meyer  [address] [place] [address]          Dear ,    We are writing to inform you of your test results.    Good news     Your labs are all fine     Normal hemoglobin a1c means no diabetes or prediabetes       Resulted Orders   Lipid panel reflex to direct LDL Fasting   Result Value Ref Range    Cholesterol 177 <200 mg/dL    Triglycerides 101 <150 mg/dL    Direct Measure HDL 58 >=40 mg/dL    LDL Cholesterol Calculated 99 <=100 mg/dL    Non HDL Cholesterol 119 <130 mg/dL    Patient Fasting > 8hrs? Unknown     Narrative    Cholesterol  Desirable:  <200 mg/dL    Triglycerides  Normal:  Less than 150 mg/dL  Borderline High:  150-199 mg/dL  High:  200-499 mg/dL  Very High:  Greater than or equal to 500 mg/dL    Direct Measure HDL  Female:  Greater than or equal to 50 mg/dL   Male:  Greater than or equal to 40 mg/dL    LDL Cholesterol  Desirable:  <100mg/dL  Above Desirable:  100-129 mg/dL   Borderline High:  130-159 mg/dL   High:  160-189 mg/dL   Very High:  >= 190 mg/dL    Non HDL Cholesterol  Desirable:  130 mg/dL  Above Desirable:  130-159 mg/dL  Borderline High:  160-189 mg/dL  High:  190-219 mg/dL  Very High:  Greater than or equal to 220 mg/dL   Comprehensive metabolic panel   Result Value Ref Range    Sodium 135 133 - 144 mmol/L    Potassium 4.4 3.4 - 5.3 mmol/L    Chloride 103 94 - 109 mmol/L    Carbon Dioxide (CO2) 27 20 - 32 mmol/L    Anion Gap 5 3 - 14 mmol/L    Urea Nitrogen 10 7 - 30 mg/dL    Creatinine 0.84 0.66 - 1.25 mg/dL    Calcium 8.9 8.5 - 10.1 mg/dL    Glucose 101 (H) 70 - 99 mg/dL    Alkaline Phosphatase 64 40 - 150 U/L    AST 19 0 - 45 U/L    ALT 29 0 - 70 U/L    Protein Total 7.5 6.8 - 8.8 g/dL    Albumin 3.9 3.4 - 5.0 g/dL    Bilirubin Total 1.1 0.2 - 1.3 mg/dL    GFR Estimate >90 >60 mL/min/1.73m2      Comment:      Effective December 21, 2021 eGFRcr in adults is calculated using the 2021 CKD-EPI creatinine equation which includes age and  gender (Melissa daniels al., [place], DOI: 10.1056/STJCsl2909631)   Hemoglobin A1c   Result Value Ref Range    Hemoglobin A1C 5.2 0.0 - 5.6 %      Comment:      Normal <5.7%   Prediabetes 5.7-6.4%    Diabetes 6.5% or higher     Note: Adopted from ADA consensus guidelines.   TSH with free T4 reflex   Result Value Ref Range    TSH 1.73 0.40 - 4.00 mU/L   Prostate Specific Antigen Screen   Result Value Ref Range    Prostate Specific Antigen Screen 0.50 0.00 - 4.00 ug/L   Vitamin D Deficiency   Result Value Ref Range    Vitamin D, Total (25-Hydroxy) 54 20 - 75 ug/L    Narrative    Season, race, dietary intake, and treatment affect the concentration of 25-hydroxy-Vitamin D. Values may decrease during winter months and increase during summer months. Values 20-29 ug/L may indicate Vitamin D insufficiency and values <20 ug/L may indicate Vitamin D deficiency.    Vitamin D determination is routinely performed by an immunoassay specific for 25 hydroxyvitamin D3.  If an individual is on vitamin D2(ergocalciferol) supplementation, please specify 25 OH vitamin D2 and D3 level determination by LCMSMS test VITD23.     CBC with platelets and differential   Result Value Ref Range    WBC Count 4.6 4.0 - 11.0 10e3/uL    RBC Count 4.73 4.40 - 5.90 10e6/uL    Hemoglobin 15.3 13.3 - 17.7 g/dL    Hematocrit 44.7 40.0 - 53.0 %    MCV 95 78 - 100 fL    MCH 32.3 26.5 - 33.0 pg    MCHC 34.2 31.5 - 36.5 g/dL    RDW 12.0 10.0 - 15.0 %    Platelet Count 268 150 - 450 10e3/uL    % Neutrophils 62 %    % Lymphocytes 24 %    % Monocytes 9 %    % Eosinophils 4 %    % Basophils 1 %    Absolute Neutrophils 2.9 1.6 - 8.3 10e3/uL    Absolute Lymphocytes 1.1 0.8 - 5.3 10e3/uL    Absolute Monocytes 0.4 0.0 - 1.3 10e3/uL    Absolute Eosinophils 0.2 0.0 - 0.7 10e3/uL    Absolute Basophils 0.1 0.0 - 0.2 10e3/uL       If you have any questions or concerns, please call the clinic at the number listed above.       Sincerely,      Sourav Harding MD

## 2022-12-13 NOTE — PROGRESS NOTES
SUBJECTIVE:   CC: Fletcher is an 62 year old who presents for preventative health visit.       Patient has been advised of split billing requirements and indicates understanding: Yes  Healthy Habits:     Getting at least 3 servings of Calcium per day:  Yes    Bi-annual eye exam:  NO    Dental care twice a year:  Yes    Sleep apnea or symptoms of sleep apnea:  Daytime drowsiness and Excessive snoring    Diet:  Regular (no restrictions) and Vegetarian/vegan    Frequency of exercise:  4-5 days/week    Duration of exercise:  Greater than 60 minutes    Taking medications regularly:  Yes    Medication side effects:  None    PHQ-2 Total Score: 0    Additional concerns today:  Yes               Today's PHQ-2 Score:   PHQ-2 ( 1999 [employer]) 12/13/2022   Q1: Little interest or pleasure in doing things 0   Q2: Feeling down, depressed or hopeless 0   PHQ-2 Score 0   PHQ-2 Total Score (12-17 Years)- Positive if 3 or more points; Administer PHQ-A if positive -   Q1: Little interest or pleasure in doing things Not at all   Q2: Feeling down, depressed or hopeless Not at all   PHQ-2 Score 0           Social History     Tobacco Use     Smoking status: Never     Smokeless tobacco: Never   Substance Use Topics     Alcohol use: Yes     Comment: occassionally drinks beer 2-4 a week     If you drink alcohol do you typically have >3 drinks per day or >7 drinks per week? Yes      Alcohol Use 12/13/2022   Prescreen: >3 drinks/day or >7 drinks/week? No   Prescreen: >3 drinks/day or >7 drinks/week? -   No flowsheet data found.    Last PSA:   PSA   Date Value Ref Range Status   02/23/2021 0.60 0 - 4 ug/L Final     Comment:     Assay Method:  Chemiluminescence using Siemens Vista analyzer       Reviewed orders with patient. Reviewed health maintenance and updated orders accordingly - Yes       Reviewed and updated as needed this visit by clinical staff   Tobacco  Allergies  Meds              Reviewed and updated as needed this visit by Provider      "                Review of Systems   Constitutional: Negative for chills and fever.   HENT: Negative for congestion, ear pain, hearing loss and sore throat.    Eyes: Negative for pain and visual disturbance.   Respiratory: Negative for cough and shortness of breath.    Cardiovascular: Negative for chest pain, palpitations and peripheral edema.   Gastrointestinal: Negative for abdominal pain, constipation, diarrhea, heartburn, hematochezia and nausea.   Genitourinary: Positive for frequency. Negative for dysuria, genital sores, hematuria, impotence, penile discharge and urgency.   Musculoskeletal: Negative for arthralgias, joint swelling and myalgias.   Skin: Negative for rash.   Neurological: Negative for dizziness, weakness, headaches and paresthesias.   Psychiatric/Behavioral: Negative for mood changes. The patient is not nervous/anxious.       not sleeping well    Gets about 5 hours of sleep total    Snoring bad/ worse    Wife has him sleep in another room    Sometimes bad sore throat    Wife has apparently said he may stop breathing    Tired during the day    Walking and hiking    Rock climbing     Some wt lifting    1 1/2 times to urinate at night    Stream okay     Has colonoscopy scheduled for January       OBJECTIVE:   BP (!) 156/107 (BP Location: Right arm, Patient Position: Chair, Cuff Size: Adult Regular)   Pulse 60   Temp 97.1  F (36.2  C) (Temporal)   Ht 1.65 m (5' 4.96\")   Wt 78.5 kg (173 lb 2 oz)   SpO2 98%   BMI 28.84 kg/m      Physical Exam  GENERAL: healthy, alert and no distress  EYES: Eyes grossly normal to inspection, PERRL and conjunctivae and sclerae normal  HENT: ear canals and TM's normal, nose and mouth without ulcers or lesions  NECK: no adenopathy, no asymmetry, masses, or scars and thyroid normal to palpation  RESP: lungs clear to auscultation - no rales, rhonchi or wheezes  CV: regular rate and rhythm, normal S1 S2, no S3 or S4, no murmur, click or rub, no peripheral edema and " peripheral pulses strong  ABDOMEN: soft, nontender, no hepatosplenomegaly, no masses and bowel sounds normal  MS: no gross musculoskeletal defects noted, no edema  SKIN: no suspicious lesions or rashes  NEURO: Normal strength and tone, mentation intact and speech normal  PSYCH: mentation appears normal, affect normal/bright  Some wax present  Right ear    Diagnostic Test Results:  Labs reviewed in Epic    ASSESSMENT/PLAN:   Jimbo was seen today for physical.    Diagnoses and all orders for this visit:    Routine general medical examination at a health care facility    Hypertension goal BP (blood pressure) < 140/90  -     amLODIPine (NORVASC) 10 MG tablet; Take 1 tablet (10 mg) by mouth daily  -     lisinopril (ZESTRIL) 40 MG tablet; Take 1 tablet (40 mg) by mouth daily    Snoring  -     Adult Sleep Eval & Management  Referral; Future    Fatigue, unspecified type  -     Adult Sleep Eval & Management  Referral; Future  -     CBC with Platelets & Differential; Future  -     TSH with free T4 reflex; Future  -     CBC with Platelets & Differential  -     TSH with free T4 reflex    Vitamin D deficiency disease  -     Vitamin D Deficiency; Future  -     Vitamin D Deficiency    Hyperlipidemia LDL goal <100  -     Lipid panel reflex to direct LDL Fasting; Future  -     Comprehensive metabolic panel; Future  -     Lipid panel reflex to direct LDL Fasting  -     Comprehensive metabolic panel    Screen for colon cancer    Screening for prostate cancer  -     Prostate Specific Antigen Screen; Future  -     Prostate Specific Antigen Screen    Screening for diabetes mellitus  -     Hemoglobin A1c; Future  -     Hemoglobin A1c    Discussed multiple issues with patient   Increase amlodipine to 10 mg   Keep acei as is  Keep working on healthy diet/exercise   Check labs  Patient needs to see sleep specialist, suspicious for sleep apnea  Has high chol, not on prescription med.  Takes fish oil. Recheck   Do colonoscopy  "as scheduled     Patient has been advised of split billing requirements and indicates understanding: Yes      COUNSELING:   Reviewed preventive health counseling, as reflected in patient instructions       Regular exercise       Healthy diet/nutrition       Vision screening       Safe sex practices/STD prevention       Colorectal cancer screening       Prostate cancer screening      BMI:   Estimated body mass index is 28.84 kg/m  as calculated from the following:    Height as of this encounter: 1.65 m (5' 4.96\").    Weight as of this encounter: 78.5 kg (173 lb 2 oz).   Weight management plan: Discussed healthy diet and exercise guidelines      He reports that he has never smoked. He has never used smokeless tobacco.         Sourav Harding MD  [facility]  "

## 2022-12-14 NOTE — RESULT ENCOUNTER NOTE
Good news    Your labs are all fine    Normal hemoglobin a1c means no diabetes or prediabetes    Sourav Harding MD

## 2023-01-18 ENCOUNTER — TRANSFERRED RECORDS (OUTPATIENT)
Dept: HEALTH INFORMATION MANAGEMENT | Facility: CLINIC | Age: 63
End: 2023-01-18
Payer: COMMERCIAL

## 2023-01-23 ENCOUNTER — OFFICE VISIT (OUTPATIENT)
Dept: SLEEP MEDICINE | Facility: CLINIC | Age: 63
End: 2023-01-23
Payer: COMMERCIAL

## 2023-01-23 VITALS
HEIGHT: 67 IN | BODY MASS INDEX: 27.98 KG/M2 | OXYGEN SATURATION: 95 % | SYSTOLIC BLOOD PRESSURE: 126 MMHG | HEART RATE: 72 BPM | WEIGHT: 178.25 LBS | DIASTOLIC BLOOD PRESSURE: 80 MMHG

## 2023-01-23 DIAGNOSIS — R53.83 FATIGUE, UNSPECIFIED TYPE: ICD-10-CM

## 2023-01-23 DIAGNOSIS — R06.83 SNORING: ICD-10-CM

## 2023-01-23 DIAGNOSIS — G47.30 SLEEP-DISORDERED BREATHING: Primary | ICD-10-CM

## 2023-01-23 PROCEDURE — 99205 OFFICE O/P NEW HI 60 MIN: CPT | Performed by: NURSE PRACTITIONER

## 2023-01-23 ASSESSMENT — SLEEP AND FATIGUE QUESTIONNAIRES
HOW LIKELY ARE YOU TO NOD OFF OR FALL ASLEEP IN A CAR, WHILE STOPPED FOR A FEW MINUTES IN TRAFFIC: SLIGHT CHANCE OF DOZING
HOW LIKELY ARE YOU TO NOD OFF OR FALL ASLEEP WHEN YOU ARE A PASSENGER IN A CAR FOR AN HOUR WITHOUT A BREAK: SLIGHT CHANCE OF DOZING
HOW LIKELY ARE YOU TO NOD OFF OR FALL ASLEEP WHILE WATCHING TV: MODERATE CHANCE OF DOZING
HOW LIKELY ARE YOU TO NOD OFF OR FALL ASLEEP WHILE SITTING QUIETLY AFTER LUNCH WITHOUT ALCOHOL: MODERATE CHANCE OF DOZING
HOW LIKELY ARE YOU TO NOD OFF OR FALL ASLEEP WHILE LYING DOWN TO REST IN THE AFTERNOON WHEN CIRCUMSTANCES PERMIT: MODERATE CHANCE OF DOZING
HOW LIKELY ARE YOU TO NOD OFF OR FALL ASLEEP WHILE SITTING AND READING: SLIGHT CHANCE OF DOZING
HOW LIKELY ARE YOU TO NOD OFF OR FALL ASLEEP WHILE SITTING INACTIVE IN A PUBLIC PLACE: SLIGHT CHANCE OF DOZING
HOW LIKELY ARE YOU TO NOD OFF OR FALL ASLEEP WHILE SITTING AND TALKING TO SOMEONE: WOULD NEVER DOZE

## 2023-01-23 NOTE — NURSING NOTE
Home sleep test and return visit has been schedule. AVS and HST instruction letter given to patient.       Craig Kunz RUDY  [facility]
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
## 2023-01-23 NOTE — NURSING NOTE
"Chief Complaint   Patient presents with     Consult     Snoring & fatigue       Initial /80   Pulse 72   Ht 1.693 m (5' 6.65\")   Wt 80.9 kg (178 lb 4 oz)   SpO2 95%   BMI 28.21 kg/m   Estimated body mass index is 28.21 kg/m  as calculated from the following:    Height as of this encounter: 1.693 m (5' 6.65\").    Weight as of this encounter: 80.9 kg (178 lb 4 oz).    Medication Reconciliation: complete    Neck circumference: 39 centimeters.    RUFINO [facility]      "

## 2023-01-23 NOTE — PATIENT INSTRUCTIONS
"      MY TREATMENT INFORMATION FOR SLEEP APNEA-  Jimbo Meyer    DOCTOR : MICHAEL Lundberg CNP    Am I having a home sleep study?  --->Watch the video for the device you are using:    -/drop off device-   https://www.App.io.com/watch?v=yGGFBdELGhk        Frequently asked questions:  1. What is Obstructive Sleep Apnea (KATHRYN)? KATHRYN is the most common type of sleep apnea. Apnea means, \"without breath.\"  Apnea is most often caused by narrowing or collapse of the upper airway as muscles relax during sleep.   Almost everyone has occasional apneas. Most people with sleep apnea have had brief interruptions at night frequently for many years.  The severity of sleep apnea is related to how frequent and severe the events are.   2. What are the consequences of KATHRYN? Symptoms include: feeling sleepy during the day, snoring loudly, gasping or stopping of breathing, trouble sleeping, and occasionally morning headaches or heartburn at night.  Sleepiness can be serious and even increase the risk of falling asleep while driving. Other health consequences may include development of high blood pressure and other cardiovascular disease in persons who are susceptible. Untreated KATHRYN  can contribute to heart disease, stroke and diabetes.   3. What are the treatment options? In most situations, sleep apnea is a lifelong disease that must be managed with daily therapy. Medications are not effective for sleep apnea and surgery is generally not considered until other therapies have been tried. Your treatment is your choice . Continuous Positive Airway (CPAP) works right away and is the therapy that is effective in nearly everyone. An oral device to hold your jaw forward is usually the next most reliable option. Other options include postioning devices (to keep you off your back), weight loss, and surgery including a tongue pacing device. There is more detail about some of these options below.  4. Are my sleep studies covered by " insurance? Although we will request verification of coverage, we advise you also check in advance of the study to ensure there is coverage.    Important tips for those choosing CPAP and similar devices   Know your equipment:  CPAP is continuous positive airway pressure that prevents obstructive sleep apnea by keeping the throat from collapsing while you are sleeping. In most cases, the device is  smart  and can slowly self-adjusts if your throat collapses and keeps a record every day of how well you are treated-this information is available to you and your care team.  BPAP is bilevel positive airway pressure that keeps your throat open and also assists each breath with a pressure boost to maintain adequate breathing.  Special kinds of BPAP are used in patients who have inadequate breathing from lung or heart disease. In most cases, the device is  smart  and can slowly self-adjusts to assist breathing. Like CPAP, the device keeps a record of how well you are treated.  Your mask is your connection to the device. You get to choose what feels most comfortable and the staff will help to make sure if fits. Here: are some examples of the different masks that are available:       Key points to remember on your journey with sleep apnea:  Sleep study.  PAP devices often need to be adjusted during a sleep study to show that they are effective and adjusted right.  Good tips to remember: Try wearing just the mask during a quiet time during the day so your body adapts to wearing it. A humidifier is recommended for comfort in most cases to prevent drying of your nose and throat. Allergy medication from your provider may help you if you are having nasal congestion.  Getting settled-in. It takes more than one night for most of us to get used to wearing a mask. Try wearing just the mask during a quiet time during the day so your body adapts to wearing it. A humidifier is recommended for comfort in most cases. Our team will work with  you carefully on the first day and will be in contact within 4 days and again at 2 and 4 weeks for advice and remote device adjustments. Your therapy is evaluated by the device each day.   Use it every night. The more you are able to sleep naturally for 7-8 hours, the more likely you will have good sleep and to prevent health risks or symptoms from sleep apnea. Even if you use it 4 hours it helps. Occasionally all of us are unable to use a medical therapy, in sleep apnea, it is not dangerous to miss one night.   Communicate. Call our skilled team on the number provided on the first day if your visit for problems that make it difficult to wear the device. Over 2 out of 3 patients can learn to wear the device long-term with help from our team. Remember to call our team or your sleep providers if you are unable to wear the device as we may have other solutions for those who cannot adapt to mask CPAP therapy. It is recommended that you sleep your sleep provider within the first 3 months and yearly after that if you are not having problems.   Use it for your health. We encourage use of CPAP masks during daytime quiet periods to allow your face and brain to adapt to the sensation of CPAP so that it will be a more natural sensation to awaken to at night or during naps. This can be very useful during the first few weeks or months of adapting to CPAP though it does not help medically to wear CPAP during wakefulness and  should not be used as a strategy just to meet guidelines.  Take care of your equipment. Make sure you clean your mask and tubing using directions every day and that your filter and mask are replaced as recommended or if they are not working.     BESIDES CPAP, WHAT OTHER THERAPIES ARE THERE?    Positioning Device  Positioning devices are generally used when sleep apnea is mild and only occurs on your back.This example shows a pillow that straps around the waist. It may be appropriate for those whose sleep  study shows milder sleep apnea that occurs primarily when lying flat on one's back. Preliminary studies have shown benefit but effectiveness at home may need to be verified by a home sleep test. These devices are generally not covered by medical insurance.  Examples of devices that maintain sleeping on the back to prevent snoring and mild sleep apnea.    Belt type body positioner  http://Healthpointz.Change.org/    Electronic reminder  http://nightshifttherapy.com/            Oral Appliance  What is oral appliance therapy?  An oral appliance device fits on your teeth at night like a retainer used after having braces. The device is made by a specialized dentist and requires several visits over 1-2 months before a manufactured device is made to fit your teeth and is adjusted to prevent your sleep apnea. Once an oral device is working properly, snoring should be improved. A home sleep test may be recommended at that time if to determine whether the sleep apnea is adequately treated.       Some things to remember:  -Oral devices are often, but not always, covered by your medical insurance. Be sure to check with your insurance provider.   -If you are referred for oral therapy, you will be given a list of specialized dentists to consider or you may choose to visit the Web site of the American Academy of Dental Sleep Medicine  -Oral devices are less likely to work if you have severe sleep apnea or are extremely overweight.     More detailed information  An oral appliance is a small acrylic device that fits over the upper and lower teeth  (similar to a retainer or a mouth guard). This device slightly moves jaw forward, which moves the base of the tongue forward, opens the airway, improves breathing for effective treat snoring and obstructive sleep apnea in perhaps 7 out of 10 people .  The best working devices are custom-made by a dental device  after a mold is made of the teeth 1, 2, 3.  When is an oral appliance  indicated?  Oral appliance therapy is recommended as a first-line treatment for patients with primary snoring, mild sleep apnea, and for patients with moderate sleep apnea who prefer appliance therapy to use of CPAP4, 5. Severity of sleep apnea is determined by sleep testing and is based on the number of respiratory events per hour of sleep.   How successful is oral appliance therapy?  The success rate of oral appliance therapy in patients with mild sleep apnea is 75-80% while in patients with moderate sleep apnea it is 50-70%. The chance of success in patients with severe sleep apnea is 40-50%. The research also shows that oral appliances have a beneficial effect on the cardiovascular health of KATHRYN patients at the same magnitude as CPAP therapy7.  Oral appliances should be a second-line treatment in cases of severe sleep apnea, but if not completely successful then a combination therapy utilizing CPAP plus oral appliance therapy may be effective. Oral appliances tend to be effective in a broad range of patients although studies show that the patients who have the highest success are females, younger patients, those with milder disease, and less severe obesity. 3, 6.   Finding a dentist that practices dental sleep medicine  Specific training is available through the American Academy of Dental Sleep Medicine for dentists interested in working in the field of sleep. To find a dentist who is educated in the field of sleep and the use of oral appliances, near you, visit the Web site of the American Academy of Dental Sleep Medicine.    References  1. Terra et al. Objectively measured vs self-reported compliance during oral appliance therapy for sleep-disordered breathing. Chest 2013; 144(5): 5453-4780.  2. Nanette, et al. Objective measurement of compliance during oral appliance therapy for sleep-disordered breathing. Thorax 2013; 68(1): 91-96.  3. Mo et al. Mandibular advancement devices in 620 men and  women with KATHRYN and snoring: tolerability and predictors of treatment success. Chest 2004; 125: 5628-7770.  4. Donte et al. Oral appliances for snoring and KATHRYN: a review. Sleep 2006; 29: 244-262.  5. José Luis et al. Oral appliance treatment for KATHRYN: an update. J Clin Sleep Med 2014; 10(2): 215-227.  6. Deion et al. Predictors of OSAH treatment outcome. J Dent Res 2007; 86: 7529-5484.      Weight Loss:    Weight loss is a long-term strategy that may improve sleep apnea in some patients.    Weight management is a personal decision and the decision should be based on your interest and the potential benefits.  If you are interested in exploring weight loss strategies, the following discussion covers the impact on weight loss on sleep apnea and the approaches that may be successful.    Being overweight does not necessarily mean you will have health consequences.  Those who have BMI over 35 or over 27 with existing medical conditions carries greater risk.   Weight loss decreases severity of sleep apnea in most people with obesity. For those with mild obesity who have developed snoring with weight gain, even 15-30 pound weight loss can improve and occasionally eliminate sleep apnea.  Structured and life-long dietary and health habits are necessary to lose weight and keep healthier weight levels.     Though there may be significant health benefits from weight loss, long-term weight loss is very difficult to achieve- studies show success with dietary management in less than 10% of people. In addition, substantial weight loss may require years of dietary control and may be difficult if patients have severe obesity. In these cases, surgical management may be considered.  Finally, older individuals who have tolerated obesity without health complications may be less likely to benefit from weight loss strategies.      [unfilled]    Surgery:    Surgery for obstructive sleep apnea is considered generally only when other  therapies fail to work. Surgery may be discussed with you if you are having a difficult time tolerating CPAP and or when there is an abnormal structure that requires surgical correction.  Nose and throat surgeries often enlarge the airway to prevent collapse.  Most of these surgeries create pain for 1-2 weeks and up to half of the most common surgeries are not effective throughout life.  You should carefully discuss the benefits and drawbacks to surgery with your sleep provider and surgeon to determine if it is the best solution for you.   More information  Surgery for KATHRYN is directed at areas that are responsible for narrowing or complete obstruction of the airway during sleep.  There are a wide range of procedures available to enlarge and/or stabilize the airway to prevent blockage of breathing in the three major areas where it can occur: the palate, tongue, and nasal regions.  Successful surgical treatment depends on the accurate identification of the factors responsible for obstructive sleep apnea in each person.  A personalized approach is required because there is no single treatment that works well for everyone.  Because of anatomic variation, consultation with an examination by a sleep surgeon is a critical first step in determining what surgical options are best for each patient.  In some cases, examination during sedation may be recommended in order to guide the selection of procedures.  Patients will be counseled about risks and benefits as well as the typical recovery course after surgery. Surgery is typically not a cure for a person s KATHRYN.  However, surgery will often significantly improve one s KATHRYN severity (termed  success rate ).  Even in the absence of a cure, surgery will decrease the cardiovascular risk associated with OSA7; improve overall quality of life8 (sleepiness, functionality, sleep quality, etc).      Palate Procedures:  Patients with KATHRYN often have narrowing of their airway in the region  of their tonsils and uvula.  The goals of palate procedures are to widen the airway in this region as well as to help the tissues resist collapse.  Modern palate procedure techniques focus on tissue conservation and soft tissue rearrangement, rather than tissue removal.  Often the uvula is preserved in this procedure. Residual sleep apnea is common in patient after pharyngoplasty with an average reduction in sleep apnea events of 33%2.      Tongue Procedures:  ExamWhile patients are awake, the muscles that surround the throat are active and keep this region open for breathing. These muscles relax during sleep, allowing the tongue and other structures to collapse and block breathing.  There are several different tongue procedures available.  Selection of a tongue base procedure depends on characteristics seen on physical exam.  Generally, procedures are aimed at removing bulky tissues in this area or preventing the back of the tongue from falling back during sleep.  Success rates for tongue surgery range from 50-62%3.    Hypoglossal Nerve Stimulation:  Hypoglossal nerve stimulation has recently received approval from the [place] Food and Drug Administration for the treatment of obstructive sleep apnea.  This is based on research showing that the system was safe and effective in treating sleep apnea6.  Results showed that the median AHI score decreased 68%, from 29.3 to 9.0. This therapy uses an implant system that senses breathing patterns and delivers mild stimulation to airway muscles, which keeps the airway open during sleep.  The system consists of three fully implanted components: a small generator (similar in size to a pacemaker), a breathing sensor, and a stimulation lead.  Using a small handheld remote, a patient turns the therapy on before bed and off upon awakening.    Candidates for this device must be greater than 18 years of age, have moderate to severe KATHRYN (AHI between 15-65), BMI less than 35,  have tried CPAP/oral appliance for at least 8 weeks without success, and have appropriate upper airway anatomy (determined by a sleep endoscopy performed by Dr. David Mo).    Hypoglossal Nerve Stimulation Pathway:    The sleep surgeon s office will work with the patient through the insurance prior-authorization process (including communications and appeals).    Nasal Procedures:  Nasal obstruction can interfere with nasal breathing during the day and night.  Studies have shown that relief of nasal obstruction can improve the ability of some patients to tolerate positive airway pressure therapy for obstructive sleep apnea1.  Treatment options include medications such as nasal saline, topical corticosteroid and antihistamine sprays, and oral medications such as antihistamines or decongestants. Non-surgical treatments can include external nasal dilators for selected patients. If these are not successful by themselves, surgery can improve the nasal airway either alone or in combination with these other options.      Combination Procedures:  Combination of surgical procedures and other treatments may be recommended, particularly if patients have more than one area of narrowing or persistent positional disease.  The success rate of combination surgery ranges from 66-80%2,3.    References  Kari HUYNH. The Role of the Nose in Snoring and Obstructive Sleep Apnoea: An Update.  Eur Arch Otorhinolaryngol. 2011; 268: 1365-73.   Medina SM; Damaso JA; Grace JR; Pallanch JF; Nicole MB; Nina SG; Klaus BARRY. Surgical modifications of the upper airway for obstructive sleep apnea in adults: a systematic review and meta-analysis. SLEEP 2010;33(10):1519-9527. Megan RIDDLE. Hypopharyngeal surgery in obstructive sleep apnea: an evidence-based medicine review.  Arch Otolaryngol Head Neck Surg. 2006 Feb;132(2):206-13.  Mohamud YH1, Alize Y, Vu DESMOND. The efficacy of anatomically based multilevel surgery for obstructive sleep apnea.  Otolaryngol Head Neck Surg. 2003 Oct;129(4):327-35.  Megan RIDDLE, Goldberg A. Hypopharyngeal Surgery in Obstructive Sleep Apnea: An Evidence-Based Medicine Review. Arch Otolaryngol Head Neck Surg. 2006 Feb;132(2):206-13.  David COLIN et al. Upper-Airway Stimulation for Obstructive Sleep Apnea.  N Engl J Med. 2014 Jan 9;370(2):139-49.  Juanjose Y et al. Increased Incidence of Cardiovascular Disease in Middle-aged Men with Obstructive Sleep Apnea. Am J Respir Crit Care Med; 2002 166: 159-165  Hood EM et al. Studying Life Effects and Effectiveness of Palatopharyngoplasty (SLEEP) study: Subjective Outcomes of Isolated Uvulopalatopharyngoplasty. Otolaryngol Head Neck Surg. 2011; 144: 623-631.        WHAT IF I ONLY HAVE SNORING?    Mandibular advancement devices, lateral sleep positioning, long-term weight loss and treatment of nasal allergies have been shown to improve snoring.  Exercising tongue muscles with a game (Picosunttps://Sapho.HitFix/us/fozia/soundly-reduce-snoring/vb8012250974) or stimulating the tongue during the day with a device (https://doi.org/10.3390/loh25989660) have improved snoring in some individuals.    Remember to Drive Safe... Drive Alive     Sleep health profoundly affects your health, mood, and your safety.  Thirty three percent of the population (one in three of us) is not getting enough sleep and many have a sleep disorder. Not getting enough sleep or having an untreated / undertreated sleep condition may make us sleepy without even knowing it. In fact, our driving could be dramatically impaired due to our sleep health. As your provider, here are some things I would like you to know about driving:     Here are some warning signs for impairment and dangerous drowsy driving:              -Having been awake more than 16 hours               -Looking tired               -Eyelid drooping              -Head nodding (it could be too late at this point)              -Driving for more than 30 minutes     Some  things you could do to make the driving safer if you are experiencing some drowsiness:              -Stop driving and rest              -Call for transportation              -Make sure your sleep disorder is adequately treated     Some things that have been shown NOT to work when experiencing drowsiness while driving:              -Turning on the radio              -Opening windows              -Eating any  distracting  /  entertaining  foods (e.g., sunflower seeds, candy, or any other)              -Talking on the phone      Your decision may not only impact your life, but also the life of others. Please, remember to drive safe for yourself and all of us.

## 2023-02-02 ASSESSMENT — SLEEP AND FATIGUE QUESTIONNAIRES
HOW LIKELY ARE YOU TO NOD OFF OR FALL ASLEEP WHILE SITTING AND TALKING TO SOMEONE: WOULD NEVER DOZE
HOW LIKELY ARE YOU TO NOD OFF OR FALL ASLEEP WHILE LYING DOWN TO REST IN THE AFTERNOON WHEN CIRCUMSTANCES PERMIT: HIGH CHANCE OF DOZING
HOW LIKELY ARE YOU TO NOD OFF OR FALL ASLEEP WHILE WATCHING TV: MODERATE CHANCE OF DOZING
HOW LIKELY ARE YOU TO NOD OFF OR FALL ASLEEP WHILE SITTING QUIETLY AFTER LUNCH WITHOUT ALCOHOL: HIGH CHANCE OF DOZING
HOW LIKELY ARE YOU TO NOD OFF OR FALL ASLEEP IN A CAR, WHILE STOPPED FOR A FEW MINUTES IN TRAFFIC: SLIGHT CHANCE OF DOZING
HOW LIKELY ARE YOU TO NOD OFF OR FALL ASLEEP WHILE SITTING INACTIVE IN A PUBLIC PLACE: SLIGHT CHANCE OF DOZING
HOW LIKELY ARE YOU TO NOD OFF OR FALL ASLEEP WHEN YOU ARE A PASSENGER IN A CAR FOR AN HOUR WITHOUT A BREAK: SLIGHT CHANCE OF DOZING
HOW LIKELY ARE YOU TO NOD OFF OR FALL ASLEEP WHILE SITTING AND READING: SLIGHT CHANCE OF DOZING

## 2023-02-06 ENCOUNTER — OFFICE VISIT (OUTPATIENT)
Dept: SLEEP MEDICINE | Facility: CLINIC | Age: 63
End: 2023-02-06
Payer: COMMERCIAL

## 2023-02-06 DIAGNOSIS — G47.30 SLEEP-DISORDERED BREATHING: ICD-10-CM

## 2023-02-06 DIAGNOSIS — R09.02 HYPOXEMIA: ICD-10-CM

## 2023-02-06 DIAGNOSIS — G47.33 OSA (OBSTRUCTIVE SLEEP APNEA): Primary | ICD-10-CM

## 2023-02-06 PROCEDURE — G0399 HOME SLEEP TEST/TYPE 3 PORTA: HCPCS | Performed by: INTERNAL MEDICINE

## 2023-02-06 NOTE — PROGRESS NOTES
Pt is completing a home sleep test. Pt was instructed on how to put on the Noxturnal T3 device and associated equipment before going to bed and given the opportunity to practice putting it on before leaving the sleep center. Pt was reminded to bring the home sleep test kit back to the center tomorrow, at agreed upon time for download and reporting.     Neck circumference: 29 cm/ 15.25 inches.      Flores Veras CMA, HST Specialist  [place] / [facility]

## 2023-02-06 NOTE — Clinical Note
DME order for CPAP start Oxymetry on CPAP in ~2 weeks Cancel 2/22 visit and change followup to 2 months

## 2023-02-07 ENCOUNTER — DOCUMENTATION ONLY (OUTPATIENT)
Dept: SLEEP MEDICINE | Facility: CLINIC | Age: 63
End: 2023-02-07
Payer: COMMERCIAL

## 2023-02-07 NOTE — PROGRESS NOTES
Pt returned HST device. It was downloaded and forwarded data to the clinical specialist for scoring.    Flores Veras CMA, HST Specialist  [place] / [facility]

## 2023-02-08 NOTE — PROGRESS NOTES
This HSAT was performed using a Noxturnal T3 device which recorded snore, sound, movement activity, body position, nasal pressure, oronasal thermal airflow, pulse, oximetry and both chest and abdominal respiratory effort. HSAT data was restricted to the time patient states they were in bed.     HSAT was scored using 1B 4% hypopnea rule.     HST AHI (Non-PAT): 43.3  Snoring was reported as moderate and loud.  Time with SpO2 below 89% was 32.7 minutes.   Overall signal quality was good     Pt will follow up with sleep provider to determine appropriate therapy.

## 2023-02-09 ENCOUNTER — OFFICE VISIT (OUTPATIENT)
Dept: FAMILY MEDICINE | Facility: CLINIC | Age: 63
End: 2023-02-09
Payer: COMMERCIAL

## 2023-02-09 VITALS
DIASTOLIC BLOOD PRESSURE: 80 MMHG | WEIGHT: 172.25 LBS | TEMPERATURE: 97.3 F | BODY MASS INDEX: 27.03 KG/M2 | SYSTOLIC BLOOD PRESSURE: 129 MMHG | RESPIRATION RATE: 16 BRPM | OXYGEN SATURATION: 97 % | HEART RATE: 72 BPM | HEIGHT: 67 IN

## 2023-02-09 DIAGNOSIS — R10.32 LLQ ABDOMINAL PAIN: Primary | ICD-10-CM

## 2023-02-09 DIAGNOSIS — I10 HYPERTENSION GOAL BP (BLOOD PRESSURE) < 140/90: ICD-10-CM

## 2023-02-09 PROCEDURE — 99213 OFFICE O/P EST LOW 20 MIN: CPT | Performed by: FAMILY MEDICINE

## 2023-02-09 ASSESSMENT — ENCOUNTER SYMPTOMS: ABDOMINAL PAIN: 1

## 2023-02-09 ASSESSMENT — PAIN SCALES - GENERAL: PAINLEVEL: NO PAIN (0)

## 2023-02-09 NOTE — PATIENT INSTRUCTIONS
Likely abd wall muscular strain    Do some easy stretching    In a few weeks if symptoms not improving call [phone] to schedule CT scan

## 2023-02-09 NOTE — PROGRESS NOTES
"       Marylou Bynum is a 62 year old }, presenting for the following health issues:  Abdominal Pain (Lower left side for the past month)      Abdominal Pain     History of Present Illness       Reason for visit:  Mild to moderate lower abdominal discomfort  Symptom onset:  3-4 weeks ago  Symptoms include:  Mild to moderate discomfort  Symptom intensity:  Moderate  Symptom progression:  Staying the same  Had these symptoms before:  No  What makes it worse:  Not that I ve noticed  What makes it better:  No, the discomfort comes and goes    He eats 4 or more servings of fruits and vegetables daily.He consumes 0 sweetened beverage(s) daily.He exercises with enough effort to increase his heart rate 30 to 60 minutes per day.  He exercises with enough effort to increase his heart rate 4 days per week.   He is taking medications regularly.        Review of Systems   Gastrointestinal: Positive for abdominal pain.      1st noticed  After snow  Shoveling    No specific  Trauma    Went away after 24 hours, then came back      No inciting events       No obvious bump    No fever/ chills/ cough    Bowels and bladder fine    There about 40 % of the time    Mild dull pain    Occasionally more significant, medium    No nausea    No change with bowels          Objective    /80 (BP Location: Left arm, Patient Position: Chair, Cuff Size: Adult Regular)   Pulse 72   Temp 97.3  F (36.3  C) (Temporal)   Resp 16   Ht 1.693 m (5' 6.65\")   Wt 78.1 kg (172 lb 4 oz)   SpO2 97%   BMI 27.26 kg/m    Body mass index is 27.26 kg/m .  Physical Exam  Constitutional:       Appearance: Normal appearance.   HENT:      Head: Normocephalic and atraumatic.   Cardiovascular:      Rate and Rhythm: Normal rate and regular rhythm.      Heart sounds: Normal heart sounds.   Pulmonary:      Effort: Pulmonary effort is normal.      Breath sounds: Normal breath sounds.   Neurological:      Mental Status: He is alert.      abd soft with only " minimal subj discomfort left lower quadrant     No pain at attachment of scrotum, no obvious hernia    Some feeling when  Twisting but not when moving hip around    No peritoneal  Signs    Staying about the same          ASSESSMENT / PLAN:  (R10.32) LLQ abdominal pain  (primary encounter diagnosis)  Comment: most likely abdominal wall muscle strain.  Discussed in detail.  Advised patient to monitor for a few weeks.  If symptoms not improving in the next few weeks then patient can schedule CT.  He wants to make sure no tumor etc present.   Plan: CT Abdomen Pelvis w/o & w Contrast             (I10) Hypertension goal BP (blood pressure) < 140/90  Comment: blood pressure better with adjustment of medications  Plan: continue same meds      I reviewed the patient's medications, allergies, medical history, family history, and social history.    Sourav Harding MD

## 2023-02-09 NOTE — PROGRESS NOTES
HST POST-STUDY QUESTIONNAIRE    1. What time did you go to bed?  About 23:00  2. How long do you think it took to fall asleep?  20 min  3. What time did you wake up to start the day?  05:45  4. Did you get up during the night at all?  yes  5. If you woke up, do you remember approximately what time(s)? Maybe 02:00, 03:00  6. Did you have any difficulty with the equipment?  No the visual instruction at the clinic and the laminated sheet were sufficiently clear  7. Did you us any type of treatment with this study?  none  8. Was the head of the bed elevated? No  9. Did you sleep in a recliner?  No  10. Did you stop using CPAP at least 3 days before this test?  NA  11. Any other information you'd like us to know? The equipment was relatively easy to put on and much less intrusive than I expected.

## 2023-02-13 NOTE — PROCEDURES
"HOME SLEEP STUDY INTERPRETATION        Patient: Jimbo Meyer  MRN: [id number]  YOB: 1960  Study Date: 2023  PCP/Referring Provider: Sourav Harding;   Ordering Provider: Sophie Nelson         Indications for Home Study: Jimbo Meyer is a 62 year old male with a history of hypertension who presents with symptoms suggestive of obstructive sleep apnea.    Estimated body mass index is 27.26 kg/m  as calculated from the following:    Height as of 23: 1.693 m (5' 6.65\").    Weight as of 23: 78.1 kg (172 lb 4 oz).  Total score - [place]: 12 (2023 10:34 AM)  STOP-BAN/8        Data: A full night home sleep study was performed recording the standard physiologic parameters including body position, movement, sound, nasal pressure, thermal oral airflow, chest and abdominal movements with respiratory inductance plethysmography, and oxygen saturation by pulse oximetry. Pulse rate was estimated by oximetry recording. This study was considered adequate based on > 4 hours of quality oximetry and respiratory recording. As specified by the AASM Manual for the Scoring of Sleep and Associated events, version 2.3, Rule VIII.D 1B, 4% oxygen desaturation scoring for hypopneas is used as a standard of care on all home sleep apnea testing.        Analysis Time:  391 minutes        Respiration:   Sleep Associated Hypoxemia: sustained hypoxemia was present. Baseline oxygen saturation was 93%.  Time with saturation less than or equal to 88% was 25 minutes. The lowest oxygen saturation was 67%.   Snoring: Snoring was present 54 % of the time at 74 dB.  Respiratory events: The home study revealed a presence of 57 obstructive apneas and 0 mixed and central apneas. There were 225 hypopneas resulting in a combined apnea/hypopnea index [AHI] of 43.3 events per hour.  AHI was 52 per hour supine, - per hour prone, 19 per hour on left side, and 37 per hour on right side.   Pattern: Excluding events noted above, " respiratory rate and pattern was Normal.      Position: Percent of time spent: supine -59%, prone -0%, on left -14%, on right -27%.      Heart Rate: By pulse oximetry normal rate was noted.       Assessment:     Severe obstructive sleep apnea.    Sleep associated hypoxemia was present.    Recommendations:    Based on patient preference at initial consultation and findings of severe obstructive sleep apnea with hypoxemia  auto-CPAP at 5-15 cmH2O was initiated with virtual coordinated care and home oximetry on CPAP to verify effectiveness.     Suggest optimizing sleep hygiene and avoiding sleep deprivation.    Diagnosis Code(s): Obstructive Sleep Apnea G47.33, Hypoxemia G47.36    LYNDSAY KENT MD, February 13, 2023   Diplomate, American Board of Internal Medicine, Sleep Medicine

## 2023-02-15 ENCOUNTER — TELEPHONE (OUTPATIENT)
Dept: SLEEP MEDICINE | Facility: CLINIC | Age: 63
End: 2023-02-15
Payer: COMMERCIAL

## 2023-02-15 NOTE — TELEPHONE ENCOUNTER
Pt was called to cancel appt on 2/22 and reschedule in 2 months per Sophie Nelson. LVM for them to call back

## 2023-03-08 ENCOUNTER — DOCUMENTATION ONLY (OUTPATIENT)
Dept: SLEEP MEDICINE | Facility: CLINIC | Age: 63
End: 2023-03-08
Payer: COMMERCIAL

## 2023-03-08 DIAGNOSIS — G47.33 OBSTRUCTIVE SLEEP APNEA (ADULT) (PEDIATRIC): Primary | ICD-10-CM

## 2023-03-08 NOTE — PROGRESS NOTES
Patient was offered choice of vendor and chose [facility].  Patient Jimbo Meyer was set up at [place] on March 8, 2023. Patient received a Resmed Airsense 11 Pressures were set at 5-15 cm H2O.   Patient s ramp is 4 cm H2O for Auto and FLEX/EPR is EPR, 1.  Patient received a Resmed Mask name: AirFit N30i Nasal mask size Small, Wide, heated tubing and heated humidifier.  Patient does not need to meet compliance. Patient has a follow up on 4/12/23 with Dr. Boykin.    JESS JOHN

## 2023-03-13 ENCOUNTER — DOCUMENTATION ONLY (OUTPATIENT)
Dept: SLEEP MEDICINE | Facility: CLINIC | Age: 63
End: 2023-03-13
Payer: COMMERCIAL

## 2023-03-13 NOTE — PROGRESS NOTES
3 day Sleep therapy management telephone visit    Diagnostic AHI:  43.3  HST    Confirmed with patient at time of call- N/A Patient is still interested in STM service       Message left for patient to return call.        Objective data     Order Settings for PAP  CPAP min     CPAP max              Device settings from machine CPAP min 5.0     CPAP max 15.0           EPR Setting ONE    RESMED soft response  OFF     Assessment: Most nights used over four hours.       Action plan: Patient to have 14 day STM visit. Patient has a follow up visit scheduled:   yes within 31-90 days of set up    Replacement device: No  STM ordered by provider: Yes     Total time spent on accessing and  interpreting remote patient PAP therapy data  10 minutes    Total time spent counseling, coaching  and reviewing PAP therapy data with patient  1 minutes    [id number] no

## 2023-03-23 ENCOUNTER — DOCUMENTATION ONLY (OUTPATIENT)
Dept: SLEEP MEDICINE | Facility: CLINIC | Age: 63
End: 2023-03-23
Payer: COMMERCIAL

## 2023-03-23 NOTE — PROGRESS NOTES
14  DAY STM VISIT    Diagnostic AHI:  43  HST    Message left for patient to return call.    Assessment: Pt  meeting objective benchmarks        Action plan: waiting for patient to return call.       Device type: Auto-CPAP    PAP settings: CPAP min 4.4 cm  H20       CPAP max 10.0 cm  H20      95th% pressure 9.6 cm  H20        RESMED EPR level Setting: ONE    RESMED Soft response setting:  OFF    Mask type:  Nasal Mask    Objective measures: 14 day rolling measures      Compliance  100 %      Leak  21.94  lpm  last  upload      AHI 6.51   last  upload      Average number of minutes 395      Objective measure goal  Compliance   Goal >70%  Leak   Goal < 24 lpm  AHI  Goal < 5  Usage  Goal >240        Total time spent on accessing and interpreting remote patient PAP therapy data  10 minutes    Total time spent counseling, coaching  and reviewing PAP therapy data with patient  1 minutes    [id number]  [id number]  no (3 day STM)

## 2023-04-11 ASSESSMENT — SLEEP AND FATIGUE QUESTIONNAIRES
HOW LIKELY ARE YOU TO NOD OFF OR FALL ASLEEP IN A CAR, WHILE STOPPED FOR A FEW MINUTES IN TRAFFIC: SLIGHT CHANCE OF DOZING
HOW LIKELY ARE YOU TO NOD OFF OR FALL ASLEEP WHILE SITTING AND TALKING TO SOMEONE: WOULD NEVER DOZE
HOW LIKELY ARE YOU TO NOD OFF OR FALL ASLEEP WHILE WATCHING TV: MODERATE CHANCE OF DOZING
HOW LIKELY ARE YOU TO NOD OFF OR FALL ASLEEP WHILE SITTING INACTIVE IN A PUBLIC PLACE: SLIGHT CHANCE OF DOZING
HOW LIKELY ARE YOU TO NOD OFF OR FALL ASLEEP WHILE SITTING AND READING: SLIGHT CHANCE OF DOZING
HOW LIKELY ARE YOU TO NOD OFF OR FALL ASLEEP WHILE LYING DOWN TO REST IN THE AFTERNOON WHEN CIRCUMSTANCES PERMIT: HIGH CHANCE OF DOZING
HOW LIKELY ARE YOU TO NOD OFF OR FALL ASLEEP WHILE SITTING QUIETLY AFTER LUNCH WITHOUT ALCOHOL: MODERATE CHANCE OF DOZING
HOW LIKELY ARE YOU TO NOD OFF OR FALL ASLEEP WHEN YOU ARE A PASSENGER IN A CAR FOR AN HOUR WITHOUT A BREAK: SLIGHT CHANCE OF DOZING

## 2023-04-12 ENCOUNTER — OFFICE VISIT (OUTPATIENT)
Dept: SLEEP MEDICINE | Facility: CLINIC | Age: 63
End: 2023-04-12
Payer: COMMERCIAL

## 2023-04-12 VITALS
HEART RATE: 59 BPM | SYSTOLIC BLOOD PRESSURE: 123 MMHG | DIASTOLIC BLOOD PRESSURE: 74 MMHG | WEIGHT: 177.4 LBS | BODY MASS INDEX: 28.07 KG/M2 | OXYGEN SATURATION: 97 %

## 2023-04-12 DIAGNOSIS — G47.33 OBSTRUCTIVE SLEEP APNEA (ADULT) (PEDIATRIC): Primary | ICD-10-CM

## 2023-04-12 PROCEDURE — 99214 OFFICE O/P EST MOD 30 MIN: CPT | Performed by: NURSE PRACTITIONER

## 2023-04-12 RX ORDER — AMLODIPINE BESYLATE 10 MG/1
1 TABLET ORAL DAILY
COMMUNITY
Start: 2023-01-18 | End: 2024-01-16

## 2023-04-12 NOTE — PROGRESS NOTES
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  Patient has done an inventory of his sleep hygiene habits and has done such things as retiring to sleep at the same time every night, arising at the same time every morning, monitoring his screen time and has made necessary changes.  Patient finds he is having difficulty convincing his wife to do the same, i.e. going to its bed and rising at the same time regardless of the day of the week.        Impression/Plan:  1.  Severe obstructive sleep apnea  2.  Sleep associated hypoxemia   Will continue his CPAP at settings of 4.4-10 cm of water pressure.  Patient did tell me that he adjusted his own settings, but finds them effective in relieving his symptoms of sleep disordered breathing at the settings.  He requests that they not be changed for now.    Severe Sleep apnea.  Tolerating PAP well. Daytime symptoms are improved.       Jimbo Meyer will follow up in about 18 month(s).     Fifteen minutes spent with patient, all of which were spent face-to-face counseling, consulting, coordinating plan of care.      Additional 15 minutes on the date of service was spent performing the following:     -Preparing to see the patient  -Obtaining and/or reviewing separately obtained history   -Ordering medications, tests, or procedures   -Documenting clinical information in the electronic or other health record        CC:  Sourav Harding,         History of Present Illness:  Chief Complaint   Patient presents with     CPAP Follow Up       Jimbo Meyer presents for follow-up of their severe sleep apnea,  managed with CPAP.     Patient underwent a home sleep test on the evening of 2/6/2023.    Analysis time: 391 minutes    AHI: 43.3 events/hour  AHI, supine: 52 events/hour  AHI, prone:-  AHI, left lateral: 19 events/hour  AHI, right lateral: 37 events/hour    SaO2, baseline: 93%  SaO2, gita: 67%  Time with saturation less than or equal to 88%: 25 minutes    Percent of analysis time spent:  Supine: 59%  Prone: 0%  Left lateral: 14%  Right lateral: 27%    Assessment:  1.  Severe obstructive sleep apnea  2.  Sleep associated hypoxemia      Do you use a CPAP Machine at home: Yes  Overall, on a scale of 0-10 how would you rate your CPAP (0 poor, 10 great): 8    What type of mask do you use: Nasal Pillow  Is your mask comfortable: Yes  If not, why:      Is your mask leaking: No  If yes, where do you feel it:    How many night per week does the mask leak (0-7):      Do you notice snoring with mask on: No  Do you notice gasping arousals with mask on: No  Are you having significant oral or nasal dryness: No  Is the pressure setting comfortable: Yes  If not, why:      What is your typical bedtime: 11pm  How long does it take you to go to sleep on PAP therapy: 15 minutes  What time do you typically get out of bed for the day: 5:30am  How many hours on average per night are you using PAP therapy: 6.5  How many hours are you sleeping per night: 6-7 hours  Do you feel well rested in the morning: No      ResMed   Auto-PAP 4.4 - 10.0 cmH2O 30 day usage data:    100% of days with > 4 hours of use. 0/30 days with no use.   Average use 415 minutes per day.   95%ile Leak 20.01 L/min.   CPAP 95% pressure 9.2 cm.   AHI 4.36 events per hour.        EPWORTH SLEEPINESS SCALE         4/11/2023    12:51 PM    [place] Sleepiness Scale ( HENNA Salter  [id number]<br>ESS - [place]/English - Final version - 21 Nov 07 - [facility] [employer] [place].)   Sitting and reading Slight chance of dozing   Watching TV Moderate chance of dozing   Sitting, inactive in a  public place (e.g. a theatre or a meeting) Slight chance of dozing   As a passenger in a car for an hour without a break Slight chance of dozing   Lying down to rest in the afternoon when circumstances permit High chance of dozing   Sitting and talking to someone Would never doze   Sitting quietly after a lunch without alcohol Moderate chance of dozing   In a car, while stopped for a few minutes in traffic Slight chance of dozing   Limestone Score (MC) 11   Limestone Score (Sleep) 11       INSOMNIA SEVERITY INDEX (EDEN)          4/11/2023    12:46 PM   Insomnia Severity Index (EDEN)   Difficulty falling asleep 2   Difficulty staying asleep 2   Problems waking up too early 1   How SATISFIED/DISSATISFIED are you with your CURRENT sleep pattern? 2   How NOTICEABLE to others do you think your sleep problem is in terms of impairing the quality of your life? 4   How WORRIED/DISTRESSED are you about your current sleep problem? 4   To what extent do you consider your sleep problem to INTERFERE with your daily functioning (e.g. daytime fatigue, mood, ability to function at work/daily chores, concentration, memory, mood, etc.) CURRENTLY? 3   EDEN Total Score 18       Guidelines for Scoring/Interpretation:  Total score categories:  0-7 = No clinically significant insomnia   8-14 = Subthreshold insomnia   15-21 = Clinical insomnia (moderate severity)  22-28 = Clinical insomnia (severe)  Used via courtesy of www.[place].va.gov with permission from Mack Lovell PhD., [facility]      Past medical/surgical history, family history, social history, medications and allergies were reviewed.        Problem List:  Patient Active Problem List    Diagnosis Date Noted     Advanced care planning/counseling discussion 02/28/2017     Priority: Medium     Hyperlipidemia LDL goal <100 02/28/2017     Priority: Medium     Erectile dysfunction, unspecified erectile dysfunction type 01/08/2016     Priority: Medium     Hypertension goal BP (blood  pressure) < 140/90 11/23/2010     Priority: Medium        /74   Pulse 59   Wt 80.5 kg (177 lb 6.4 oz)   SpO2 97%   BMI 28.07 kg/m        MICHAEL Ewing, CNP  Sleep Medicine    This note was written with the assistance of the Dragon voice-dictation technology software. The final document, although reviewed, may contain errors. For corrections, please contact the office.

## 2023-04-12 NOTE — NURSING NOTE
"Chief Complaint   Patient presents with     CPAP Follow Up       Initial /74   Pulse 59   Wt 80.5 kg (177 lb 6.4 oz)   SpO2 97%   BMI 28.07 kg/m   Estimated body mass index is 28.07 kg/m  as calculated from the following:    Height as of 2/9/23: 1.693 m (5' 6.65\").    Weight as of this encounter: 80.5 kg (177 lb 6.4 oz).    Medication Reconciliation: complete    Niraj Licea MA  [facility]  "

## 2023-04-23 ENCOUNTER — HEALTH MAINTENANCE LETTER (OUTPATIENT)
Age: 63
End: 2023-04-23

## 2023-11-13 ENCOUNTER — PATIENT OUTREACH (OUTPATIENT)
Dept: CARE COORDINATION | Facility: CLINIC | Age: 63
End: 2023-11-13
Payer: COMMERCIAL

## 2023-11-27 ENCOUNTER — PATIENT OUTREACH (OUTPATIENT)
Dept: CARE COORDINATION | Facility: CLINIC | Age: 63
End: 2023-11-27
Payer: COMMERCIAL

## 2023-12-28 ENCOUNTER — PATIENT OUTREACH (OUTPATIENT)
Dept: CARE COORDINATION | Facility: CLINIC | Age: 63
End: 2023-12-28
Payer: COMMERCIAL

## 2024-01-11 ENCOUNTER — PATIENT OUTREACH (OUTPATIENT)
Dept: CARE COORDINATION | Facility: CLINIC | Age: 64
End: 2024-01-11
Payer: COMMERCIAL

## 2024-01-16 ENCOUNTER — OFFICE VISIT (OUTPATIENT)
Dept: FAMILY MEDICINE | Facility: CLINIC | Age: 64
End: 2024-01-16
Payer: COMMERCIAL

## 2024-01-16 VITALS
SYSTOLIC BLOOD PRESSURE: 110 MMHG | WEIGHT: 176 LBS | BODY MASS INDEX: 29.32 KG/M2 | HEIGHT: 65 IN | OXYGEN SATURATION: 96 % | TEMPERATURE: 97.9 F | DIASTOLIC BLOOD PRESSURE: 71 MMHG | HEART RATE: 64 BPM

## 2024-01-16 DIAGNOSIS — Z00.00 ROUTINE GENERAL MEDICAL EXAMINATION AT A HEALTH CARE FACILITY: Primary | ICD-10-CM

## 2024-01-16 DIAGNOSIS — Z13.6 CARDIOVASCULAR SCREENING; LDL GOAL LESS THAN 100: ICD-10-CM

## 2024-01-16 DIAGNOSIS — F43.9 STRESS: ICD-10-CM

## 2024-01-16 DIAGNOSIS — L81.0 POST-INFLAMMATORY HYPERPIGMENTATION: ICD-10-CM

## 2024-01-16 DIAGNOSIS — R53.83 FATIGUE, UNSPECIFIED TYPE: ICD-10-CM

## 2024-01-16 DIAGNOSIS — L28.1 PICKER'S NODULES: ICD-10-CM

## 2024-01-16 DIAGNOSIS — R73.01 IMPAIRED FASTING GLUCOSE: ICD-10-CM

## 2024-01-16 DIAGNOSIS — N52.9 ERECTILE DYSFUNCTION, UNSPECIFIED ERECTILE DYSFUNCTION TYPE: ICD-10-CM

## 2024-01-16 DIAGNOSIS — I10 HYPERTENSION GOAL BP (BLOOD PRESSURE) < 140/90: ICD-10-CM

## 2024-01-16 DIAGNOSIS — Z12.5 SCREENING FOR PROSTATE CANCER: ICD-10-CM

## 2024-01-16 LAB
ALBUMIN SERPL BCG-MCNC: 4.5 G/DL (ref 3.5–5.2)
ALP SERPL-CCNC: 59 U/L (ref 40–150)
ALT SERPL W P-5'-P-CCNC: 25 U/L (ref 0–70)
ANION GAP SERPL CALCULATED.3IONS-SCNC: 9 MMOL/L (ref 7–15)
AST SERPL W P-5'-P-CCNC: 28 U/L (ref 0–45)
BASOPHILS # BLD AUTO: 0 10E3/UL (ref 0–0.2)
BASOPHILS NFR BLD AUTO: 1 %
BILIRUB SERPL-MCNC: 1.1 MG/DL
BUN SERPL-MCNC: 9.7 MG/DL (ref 8–23)
CALCIUM SERPL-MCNC: 9.6 MG/DL (ref 8.8–10.2)
CHLORIDE SERPL-SCNC: 99 MMOL/L (ref 98–107)
CHOLEST SERPL-MCNC: 158 MG/DL
CREAT SERPL-MCNC: 0.91 MG/DL (ref 0.67–1.17)
DEPRECATED HCO3 PLAS-SCNC: 25 MMOL/L (ref 22–29)
EGFRCR SERPLBLD CKD-EPI 2021: >90 ML/MIN/1.73M2
EOSINOPHIL # BLD AUTO: 0.1 10E3/UL (ref 0–0.7)
EOSINOPHIL NFR BLD AUTO: 3 %
ERYTHROCYTE [DISTWIDTH] IN BLOOD BY AUTOMATED COUNT: 11.6 % (ref 10–15)
FASTING STATUS PATIENT QL REPORTED: YES
GLUCOSE SERPL-MCNC: 91 MG/DL (ref 70–99)
HBA1C MFR BLD: 5.3 % (ref 0–5.6)
HCT VFR BLD AUTO: 43.4 % (ref 40–53)
HDLC SERPL-MCNC: 48 MG/DL
HGB BLD-MCNC: 15.1 G/DL (ref 13.3–17.7)
IMM GRANULOCYTES # BLD: 0 10E3/UL
IMM GRANULOCYTES NFR BLD: 0 %
LDLC SERPL CALC-MCNC: 99 MG/DL
LYMPHOCYTES # BLD AUTO: 0.8 10E3/UL (ref 0.8–5.3)
LYMPHOCYTES NFR BLD AUTO: 25 %
MCH RBC QN AUTO: 32.5 PG (ref 26.5–33)
MCHC RBC AUTO-ENTMCNC: 34.8 G/DL (ref 31.5–36.5)
MCV RBC AUTO: 94 FL (ref 78–100)
MONOCYTES # BLD AUTO: 0.6 10E3/UL (ref 0–1.3)
MONOCYTES NFR BLD AUTO: 18 %
NEUTROPHILS # BLD AUTO: 1.7 10E3/UL (ref 1.6–8.3)
NEUTROPHILS NFR BLD AUTO: 53 %
NONHDLC SERPL-MCNC: 110 MG/DL
PLATELET # BLD AUTO: 233 10E3/UL (ref 150–450)
POTASSIUM SERPL-SCNC: 4.2 MMOL/L (ref 3.4–5.3)
PROT SERPL-MCNC: 7.1 G/DL (ref 6.4–8.3)
PSA SERPL DL<=0.01 NG/ML-MCNC: 0.33 NG/ML (ref 0–4.5)
RBC # BLD AUTO: 4.64 10E6/UL (ref 4.4–5.9)
SODIUM SERPL-SCNC: 133 MMOL/L (ref 135–145)
TRIGL SERPL-MCNC: 53 MG/DL
TSH SERPL DL<=0.005 MIU/L-ACNC: 1.83 UIU/ML (ref 0.3–4.2)
WBC # BLD AUTO: 3.2 10E3/UL (ref 4–11)

## 2024-01-16 PROCEDURE — 85025 COMPLETE CBC W/AUTO DIFF WBC: CPT | Performed by: FAMILY MEDICINE

## 2024-01-16 PROCEDURE — 80053 COMPREHEN METABOLIC PANEL: CPT | Performed by: FAMILY MEDICINE

## 2024-01-16 PROCEDURE — 99213 OFFICE O/P EST LOW 20 MIN: CPT | Mod: 25 | Performed by: FAMILY MEDICINE

## 2024-01-16 PROCEDURE — G0103 PSA SCREENING: HCPCS | Performed by: FAMILY MEDICINE

## 2024-01-16 PROCEDURE — 84403 ASSAY OF TOTAL TESTOSTERONE: CPT | Performed by: FAMILY MEDICINE

## 2024-01-16 PROCEDURE — 83036 HEMOGLOBIN GLYCOSYLATED A1C: CPT | Performed by: FAMILY MEDICINE

## 2024-01-16 PROCEDURE — 36415 COLL VENOUS BLD VENIPUNCTURE: CPT | Performed by: FAMILY MEDICINE

## 2024-01-16 PROCEDURE — 80061 LIPID PANEL: CPT | Performed by: FAMILY MEDICINE

## 2024-01-16 PROCEDURE — 99396 PREV VISIT EST AGE 40-64: CPT | Performed by: FAMILY MEDICINE

## 2024-01-16 PROCEDURE — 84443 ASSAY THYROID STIM HORMONE: CPT | Performed by: FAMILY MEDICINE

## 2024-01-16 RX ORDER — LISINOPRIL 40 MG/1
40 TABLET ORAL DAILY
Qty: 90 TABLET | Refills: 3 | Status: SHIPPED | OUTPATIENT
Start: 2024-01-16

## 2024-01-16 RX ORDER — TADALAFIL 20 MG/1
TABLET ORAL
Qty: 6 TABLET | Refills: 11 | Status: SHIPPED | OUTPATIENT
Start: 2024-01-16

## 2024-01-16 RX ORDER — AMLODIPINE BESYLATE 10 MG/1
10 TABLET ORAL DAILY
Qty: 90 TABLET | Refills: 3 | Status: SHIPPED | OUTPATIENT
Start: 2024-01-16

## 2024-01-16 ASSESSMENT — ENCOUNTER SYMPTOMS
DIZZINESS: 0
FREQUENCY: 0
EYE PAIN: 0
PALPITATIONS: 0
HEADACHES: 0
NERVOUS/ANXIOUS: 0
HEMATURIA: 0
SHORTNESS OF BREATH: 0
ABDOMINAL PAIN: 0
CONSTIPATION: 0
DIARRHEA: 0
JOINT SWELLING: 0
NAUSEA: 0
SORE THROAT: 0
HEMATOCHEZIA: 0
DYSURIA: 0
CHILLS: 0
PARESTHESIAS: 0
FEVER: 0
WEAKNESS: 0
HEARTBURN: 0
ARTHRALGIAS: 0
MYALGIAS: 0
COUGH: 0

## 2024-01-16 ASSESSMENT — PAIN SCALES - GENERAL: PAINLEVEL: NO PAIN (0)

## 2024-01-16 NOTE — PROGRESS NOTES
SUBJECTIVE:   Fletcher is a 63 year old, presenting for the following:  Physical        1/16/2024     7:14 AM   Additional Questions   Roomed by cam   Accompanied by none         1/16/2024     7:14 AM   Patient Reported Additional Medications   Patient reports taking the following new medications none       Healthy Habits:     Getting at least 3 servings of Calcium per day:  Yes    Bi-annual eye exam:  NO    Dental care twice a year:  Yes    Sleep apnea or symptoms of sleep apnea:  Sleep apnea    Diet:  Regular (no restrictions)    Frequency of exercise:  4-5 days/week    Duration of exercise:  45-60 minutes    Taking medications regularly:  Yes    Barriers to taking medications:  None    Medication side effects:  None    Additional concerns today:  Yes                Derm Issue      Social History     Tobacco Use    Smoking status: Never    Smokeless tobacco: Never   Substance Use Topics    Alcohol use: Yes     Comment: occassionally drinks beer 2-4 a week             1/16/2024     6:50 AM   Alcohol Use   Prescreen: >3 drinks/day or >7 drinks/week? No       Last PSA:   PSA   Date Value Ref Range Status   02/23/2021 0.60 0 - 4 ug/L Final     Comment:     Assay Method:  Chemiluminescence using Siemens Vista analyzer     Prostate Specific Antigen Screen   Date Value Ref Range Status   12/13/2022 0.50 0.00 - 4.00 ug/L Final       Reviewed orders with patient. Reviewed health maintenance and updated orders accordingly - Yes       Reviewed and updated as needed this visit by clinical staff   Tobacco  Allergies  Meds              Reviewed and updated as needed this visit by Provider                     Review of Systems   Constitutional:  Negative for chills and fever.   HENT:  Negative for congestion, ear pain, hearing loss and sore throat.    Eyes:  Negative for pain and visual disturbance.   Respiratory:  Negative for cough and shortness of breath.    Cardiovascular:  Negative for chest pain, palpitations and peripheral  edema.   Gastrointestinal:  Negative for abdominal pain, constipation, diarrhea, heartburn, hematochezia and nausea.   Genitourinary:  Negative for dysuria, frequency, genital sores, hematuria, impotence, penile discharge and urgency.   Musculoskeletal:  Negative for arthralgias, joint swelling and myalgias.   Skin:  Negative for rash.   Neurological:  Negative for dizziness, weakness, headaches and paresthesias.   Psychiatric/Behavioral:  Negative for mood changes. The patient is not nervous/anxious.      Maybe a stress rash    Patient does scratch/ itch at upper arms and chest    Will likely be  soon    Lives apart from spouse    Retiring in a year     Active job    Wt lifting    Cardio    Several times per week    Rock climbing    7 hours of sleep    Uses cpap all night        OBJECTIVE:   There were no vitals taken for this visit.    Physical Exam  GENERAL: healthy, alert and no distress  EYES: Eyes grossly normal to inspection, PERRL and conjunctivae and sclerae normal  HENT: ear canals and TM's normal, nose and mouth without ulcers or lesions  NECK: no adenopathy, no asymmetry, masses, or scars and thyroid normal to palpation  RESP: lungs clear to auscultation - no rales, rhonchi or wheezes  CV: regular rate and rhythm, normal S1 S2, no S3 or S4, no murmur, click or rub, no peripheral edema and peripheral pulses strong  ABDOMEN: soft, nontender, no hepatosplenomegaly, no masses and bowel sounds normal  MS: no gross musculoskeletal defects noted, no edema  SKIN: no suspicious lesions or rashes; he has numerous small pickers nodules on upper arms and chest, and leions that basically are post inflammatory hyperpigmentation  NEURO: Normal strength and tone, mentation intact and speech normal  PSYCH: mentation appears normal, affect normal/bright    Diagnostic Test Results:  Labs reviewed in Epic    ASSESSMENT/PLAN:   Fletcher was seen today for physical.    Diagnoses and all orders for this visit:    Routine  "general medical examination at a health care facility    Hypertension goal BP (blood pressure) < 140/90  -     amLODIPine (NORVASC) 10 MG tablet; Take 1 tablet (10 mg) by mouth daily  -     lisinopril (ZESTRIL) 40 MG tablet; Take 1 tablet (40 mg) by mouth daily    Screening for prostate cancer  -     Prostate Specific Antigen Screen; Future  -     Prostate Specific Antigen Screen    Impaired fasting glucose  -     Hemoglobin A1c; Future  -     Hemoglobin A1c    CARDIOVASCULAR SCREENING; LDL GOAL LESS THAN 100  -     Lipid panel reflex to direct LDL Fasting; Future  -     Lipid panel reflex to direct LDL Fasting    Fatigue, unspecified type  -     CBC with Platelets & Differential; Future  -     Comprehensive metabolic panel; Future  -     TSH with free T4 reflex; Future  -     Testosterone total; Future  -     CBC with Platelets & Differential  -     Comprehensive metabolic panel  -     TSH with free T4 reflex  -     Testosterone total    Erectile dysfunction, unspecified erectile dysfunction type  -     tadalafil (CIALIS) 20 MG tablet; TAKE 1 TABLET BY MOUTH ONCE DAILY AS NEEDED NEVER  USE  WITH  NITROGLYCERIN    Stress    's nodules    Post-inflammatory hyperpigmentation    Discussed multiple issues with patient  Try not to scratch/ pick at skin  Use moisturizing lotion and benadryl cream as needed   Check multiple labs   Refill blood pressure and ED meds  No std concern  Keep working on healthy diet/exercise       Patient has been advised of split billing requirements and indicates understanding: Yes      COUNSELING:   Reviewed preventive health counseling, as reflected in patient instructions       Regular exercise       Healthy diet/nutrition       Vision screening       Safe sex practices/STD prevention       Colorectal cancer screening       Prostate cancer screening      BMI:   Estimated body mass index is 28.07 kg/m  as calculated from the following:    Height as of 2/9/23: 1.693 m (5' 6.65\").    " Weight as of 4/12/23: 80.5 kg (177 lb 6.4 oz).   Weight management plan: Discussed healthy diet and exercise guidelines      He reports that he has never smoked. He has never used smokeless tobacco.            Sourav Harding MD  [facility]

## 2024-01-16 NOTE — PATIENT INSTRUCTIONS
Use moisturizing lotion regularly    Try no to scratch/ itch    Could use over the counter diphenhydramine ( benadryl ) cream as needed     We will send you lab results    Keep working on healthy diet/exercise

## 2024-01-17 NOTE — RESULT ENCOUNTER NOTE
Sodium level is a bit low, but this does not mean you should eat more salt.  If sodium is just mildly low it is not worrisome.    Other labs are okay, but testosterone level is pending.    Sourav Harding MD

## 2024-01-18 LAB — TESTOST SERPL-MCNC: 448 NG/DL (ref 240–950)

## 2024-06-20 NOTE — LETTER
[facility]   [address] [place]  [address]  [phone]                                   2018    Jimbo Meyer  [address] [place] [address]        Dear Jimbo,    Your heart ultrasound was normal.  Okay to proceed with surgery.     Results for orders placed or performed in visit on 18   Echocardiogram Complete    Narrative    [id number]  [facility]  [id number]  [id number]^TANNER^STAR^ENRIQUE           [facility], Echocardiography Laboratory  [address] [place] [id number]        Name: JIMBO MEYER  MRN: [id number]  : 1960  Study Date: 2018 04:09 PM  Age: 58 yrs  Gender: Male  Patient Location: [facility]  Reason For Study: , Preop general physical exam  Ordering Physician: STAR HART  Referring Physician: STAR HART  Performed By: Neftaly Izquierdo RDCS     BSA: 1.9 m2  Height: 65 in  Weight: 182 lb  BP: 134/88 mmHg  _____________________________________________________________________________  __        Procedure  Echocardiogram with two-dimensional, color and spectral Doppler performed.  _____________________________________________________________________________  __        Interpretation Summary     Global and regional left ventricular function is normal with an EF of 55-60%.  Right ventricular function, chamber size, wall motion, and thickness are  normal.  No significant valve abnormalities present.  The inferior vena cava was normal in size with preserved respiratory  variability.  Mildly dilated aortic root at 4.1 cm and ascending aorta at 4.2 cm, indexes to  2.2 cm/m2 for BSA. The aortic valve is tricuspid.  No pericardial effusion is present.     This study was compared with the exercise stress study from 2010. Mild  dilation of proximal aorta is now evident.        _____________________________________________________________________________  __        Left Ventricle  Global and  regional left ventricular function is normal with an EF of 55-60%.  Left ventricular wall thickness is normal. Left ventricular size is normal.  Left ventricular diastolic function is normal. No regional wall motion  abnormalities are seen.     Right Ventricle  Right ventricular function, chamber size, wall motion, and thickness are  normal. TAPSE 2.2 cm.     Atria  The left atrium appears normal. The right atria appears normal. The atrial  septum is intact as assessed by color Doppler .     Mitral Valve  The mitral valve is normal. Trace mitral insufficiency is present.        Aortic Valve  Aortic valve is normal in structure and function. The aortic valve is  tricuspid. Mild aortic valve sclerosis is present. Trace aortic insufficiency  is present.     Tricuspid Valve  The tricuspid valve is normal. Trace tricuspid insufficiency is present. Right  ventricular systolic pressure is 19mmHg above the right atrial pressure.     Pulmonic Valve  The pulmonic valve is normal.     Vessels  The pulmonary artery cannot be assessed. The inferior vena cava was normal in  size with preserved respiratory variability. Sinuses of Valsalva 4.1 cm.  Ascending aorta 4.2 cm. Mildly dilated aortic root at 4.1 cm and ascending  aorta at 4.2 cm, indexes to 2.2 cm/m2 for BSA. Estimated mean right atrial  pressure is 3 mmHg (normal).     Pericardium  No pericardial effusion is present.        Compared to Previous Study  This study was compared with the study from 09/13/2010 .     Attestation  I have personally viewed the imaging and agree with the interpretation and  report as documented by the fellow, Delmis Whiting, and/or edited by me.  _____________________________________________________________________________  __     MMode/2D Measurements & Calculations  IVSd: 0.84 cm  LVIDd: 4.8 cm  LVIDs: 2.7 cm  LVPWd: 0.93 cm  FS: 43.0 %  LV mass(C)d: 144.6 grams  LV mass(C)dI: 76.1 grams/m2  Ao root diam: 4.1 cm  asc Aorta Diam: 4.2 cm  LVOT  diam: 2.2 cm  LVOT area: 3.9 cm2  LA Volume (BP): 59.0 ml     LA Volume Index (BP): 31.1 ml/m2  RWT: 0.39        Doppler Measurements & Calculations  MV E max flynn: 99.9 cm/sec  MV A max flynn: 82.1 cm/sec  MV E/A: 1.2  MV dec time: 0.19 sec  TR max flynn: 214.5 cm/sec  TR max P.4 mmHg  E/E' avg: 10.6  Lateral E/e': 8.8  Medial E/e': 12.4              _____________________________________________________________________________  __        Report approved by: Kathleen JAVIER 2018 05:04 PM          If you have any questions please call the clinic at [phone]    Sincerely,    Sourav Harding MD  hnr     Detail Level: Zone Size Of Lesion In Cm (Optional): 0 Detail Level: Detailed

## 2024-12-17 ENCOUNTER — PATIENT OUTREACH (OUTPATIENT)
Dept: CARE COORDINATION | Facility: CLINIC | Age: 64
End: 2024-12-17
Payer: COMMERCIAL

## 2025-03-19 DIAGNOSIS — N52.9 ERECTILE DYSFUNCTION, UNSPECIFIED ERECTILE DYSFUNCTION TYPE: ICD-10-CM

## 2025-03-20 RX ORDER — TADALAFIL 20 MG/1
TABLET ORAL
Qty: 6 TABLET | Refills: 0 | OUTPATIENT
Start: 2025-03-20

## 2025-05-09 ENCOUNTER — OFFICE VISIT (OUTPATIENT)
Dept: OPHTHALMOLOGY | Facility: CLINIC | Age: 65
End: 2025-05-09
Payer: COMMERCIAL

## 2025-05-09 DIAGNOSIS — Z01.00 EXAMINATION OF EYES AND VISION: Primary | ICD-10-CM

## 2025-05-09 DIAGNOSIS — H52.203 HYPEROPIA OF BOTH EYES WITH ASTIGMATISM AND PRESBYOPIA: ICD-10-CM

## 2025-05-09 DIAGNOSIS — H52.03 HYPEROPIA OF BOTH EYES WITH ASTIGMATISM AND PRESBYOPIA: ICD-10-CM

## 2025-05-09 DIAGNOSIS — H52.4 HYPEROPIA OF BOTH EYES WITH ASTIGMATISM AND PRESBYOPIA: ICD-10-CM

## 2025-05-09 PROCEDURE — 92004 COMPRE OPH EXAM NEW PT 1/>: CPT | Performed by: STUDENT IN AN ORGANIZED HEALTH CARE EDUCATION/TRAINING PROGRAM

## 2025-05-09 PROCEDURE — 92015 DETERMINE REFRACTIVE STATE: CPT | Performed by: STUDENT IN AN ORGANIZED HEALTH CARE EDUCATION/TRAINING PROGRAM

## 2025-05-09 ASSESSMENT — REFRACTION_MANIFEST
OD_ADD: +2.50
OD_SPHERE: +0.50
OD_AXIS: 128
OS_AXIS: 170
OS_SPHERE: PLANO
OS_ADD: +2.50
OS_CYLINDER: +0.50
OD_CYLINDER: +0.25

## 2025-05-09 ASSESSMENT — CONF VISUAL FIELD
OD_NORMAL: 1
OD_SUPERIOR_NASAL_RESTRICTION: 0
OS_SUPERIOR_TEMPORAL_RESTRICTION: 0
OS_NORMAL: 1
OD_INFERIOR_TEMPORAL_RESTRICTION: 0
OS_INFERIOR_TEMPORAL_RESTRICTION: 0
OS_SUPERIOR_NASAL_RESTRICTION: 0
OD_INFERIOR_NASAL_RESTRICTION: 0
OD_SUPERIOR_TEMPORAL_RESTRICTION: 0
METHOD: COUNTING FINGERS
OS_INFERIOR_NASAL_RESTRICTION: 0

## 2025-05-09 ASSESSMENT — EXTERNAL EXAM - RIGHT EYE: OD_EXAM: NORMAL

## 2025-05-09 ASSESSMENT — VISUAL ACUITY
OS_SC+: -2
OS_SC: 20/30
OD_SC: 20/30
METHOD: SNELLEN - LINEAR
OD_SC+: -2

## 2025-05-09 ASSESSMENT — CUP TO DISC RATIO
OS_RATIO: 0.5
OD_RATIO: 0.5

## 2025-05-09 ASSESSMENT — EXTERNAL EXAM - LEFT EYE: OS_EXAM: NORMAL

## 2025-05-09 ASSESSMENT — SLIT LAMP EXAM - LIDS
COMMENTS: 1+ MEIBOMIAN GLAND DYSFUNCTION, 1+ DERMATOCHALASIS
COMMENTS: 1+ MEIBOMIAN GLAND DYSFUNCTION, 1+ DERMATOCHALASIS

## 2025-05-09 ASSESSMENT — TONOMETRY
OD_IOP_MMHG: 14
OS_IOP_MMHG: 15
IOP_METHOD: APPLANATION

## 2025-05-09 NOTE — PROGRESS NOTES
Current Eye Medications:  None     Subjective:  Patient is here due to decrease in vision since his last eye exam about a year ago. He's noticing finer details are blurrier when looking in the distance and at near as well. He's wearing OTC readers +2.25 and thinks he might need to go a little stronger. Patient denies both floaters and flashing lights. He has no ocular pain or discomfort. No other ocular concerns today.     First eye exam in our clinic. No eye records in Epic.     Objective:  See Ophthalmology Exam.       Assessment:  Jimbo Meyer is a 64 year old male who presents with:   Encounter Diagnoses   Name Primary?    Examination of eyes and vision Yes    Hyperopia of both eyes with astigmatism and presbyopia        Plan:  Glasses prescription given (or can use over the counter readers around +2.50 or +2.75)    Ilene Clark MD  [phone]

## 2025-05-09 NOTE — LETTER
5/9/2025      Jimbo Meyer  [address] [place] [address]      Dear Colleague,    Thank you for referring your patient, Jimbo Meyer, to the [facility]. Please see a copy of my visit note below.     Current Eye Medications:  None     Subjective:  Patient is here due to decrease in vision since his last eye exam about a year ago. He's noticing finer details are blurrier when looking in the distance and at near as well. He's wearing OTC readers +2.25 and thinks he might need to go a little stronger. Patient denies both floaters and flashing lights. He has no ocular pain or discomfort. No other ocular concerns today.     First eye exam in our clinic. No eye records in Epic.     Objective:  See Ophthalmology Exam.       Assessment:  Jimbo Meyer is a 64 year old male who presents with:   Encounter Diagnoses   Name Primary?     Examination of eyes and vision Yes     Hyperopia of both eyes with astigmatism and presbyopia        Plan:  Glasses prescription given (or can use over the counter readers around +2.50 or +2.75)    Ilene Clark MD  [phone]         Again, thank you for allowing me to participate in the care of your patient.        Sincerely,        Ilene Clark MD    Electronically signed

## 2025-05-09 NOTE — PATIENT INSTRUCTIONS
Glasses prescription given (or can use over the counter readers around +2.50 or +2.75)    Ilene Clark MD  [phone]

## 2025-05-20 ENCOUNTER — OFFICE VISIT (OUTPATIENT)
Dept: OPHTHALMOLOGY | Facility: CLINIC | Age: 65
End: 2025-05-20
Payer: COMMERCIAL

## 2025-05-20 DIAGNOSIS — H43.811 POSTERIOR VITREOUS DETACHMENT OF RIGHT EYE: ICD-10-CM

## 2025-05-20 DIAGNOSIS — H35.341 LAMELLAR MACULAR HOLE, RIGHT EYE: Primary | ICD-10-CM

## 2025-05-20 PROCEDURE — 92014 COMPRE OPH EXAM EST PT 1/>: CPT | Performed by: STUDENT IN AN ORGANIZED HEALTH CARE EDUCATION/TRAINING PROGRAM

## 2025-05-20 PROCEDURE — 92134 CPTRZ OPH DX IMG PST SGM RTA: CPT | Performed by: STUDENT IN AN ORGANIZED HEALTH CARE EDUCATION/TRAINING PROGRAM

## 2025-05-20 ASSESSMENT — EXTERNAL EXAM - RIGHT EYE: OD_EXAM: NORMAL

## 2025-05-20 ASSESSMENT — VISUAL ACUITY
OS_SC+: -2
OD_SC: 20/50
METHOD: SNELLEN - LINEAR
OD_PH_SC: 20/30
OD_SC+: -1
OD_PH_SC+: -1
OS_SC: 20/30

## 2025-05-20 ASSESSMENT — CUP TO DISC RATIO
OD_RATIO: 0.5
OS_RATIO: 0.5

## 2025-05-20 ASSESSMENT — TONOMETRY
OD_IOP_MMHG: 17
IOP_METHOD: APPLANATION

## 2025-05-20 ASSESSMENT — EXTERNAL EXAM - LEFT EYE: OS_EXAM: NORMAL

## 2025-05-20 NOTE — LETTER
5/20/2025      Jimbo Meyer  [address] [place] [address]      Dear Colleague,    Thank you for referring your patient, Jimbo Meyer, to the [facility]. Please see a copy of my visit note below.     Current Eye Medications:  none.       Subjective:  Starting one week ago, he noticed the onset of flashes of light in his right eye intermittently (daylight and night), followed by a vertical area of blurry vision in his temporal visual field.  Initially it was a well-defined vertical line, but now is less defined, but still present.  Overall, central vision is good in each eye.      Objective:  See Ophthalmology Exam.       Assessment:  Jimbo Meyer is a 64 year old male who presents with:   Encounter Diagnoses   Name Primary?     Lamellar macular hole, right eye Small outer retinal interruption of retinal pigment epithelium layer seen on macular OCT today (left eye within normal limits).     Posterior vitreous detachment of right eye        Plan:  Referral to retina specialist for evaluation of lamellar hole right eye    Ilene Clark MD  [phone]      Again, thank you for allowing me to participate in the care of your patient.        Sincerely,        Ilene Clark MD    Electronically signed

## 2025-05-20 NOTE — PROGRESS NOTES
Current Eye Medications:  none.       Subjective:  Starting one week ago, he noticed the onset of flashes of light in his right eye intermittently (daylight and night), followed by a vertical area of blurry vision in his temporal visual field.  Initially it was a well-defined vertical line, but now is less defined, but still present.  Overall, central vision is good in each eye.      Objective:  See Ophthalmology Exam.       Assessment:  Jimbo Meyer is a 64 year old male who presents with:   Encounter Diagnoses   Name Primary?    Lamellar macular hole, right eye Small outer retinal interruption of retinal pigment epithelium layer seen on macular OCT today (left eye within normal limits).    Posterior vitreous detachment of right eye        Plan:  Referral to retina specialist for evaluation of lamellar hole right eye    Ilene Clark MD  [phone]

## 2025-05-20 NOTE — PATIENT INSTRUCTIONS
Referral to retina specialist for evaluation of lamellar hole right eye    Ilene Clark MD  [phone]

## 2025-05-21 ENCOUNTER — PATIENT OUTREACH (OUTPATIENT)
Dept: CARE COORDINATION | Facility: CLINIC | Age: 65
End: 2025-05-21
Payer: COMMERCIAL